# Patient Record
Sex: MALE | Race: BLACK OR AFRICAN AMERICAN | NOT HISPANIC OR LATINO | Employment: UNEMPLOYED | URBAN - NONMETROPOLITAN AREA
[De-identification: names, ages, dates, MRNs, and addresses within clinical notes are randomized per-mention and may not be internally consistent; named-entity substitution may affect disease eponyms.]

---

## 2021-09-29 ENCOUNTER — OFFICE VISIT (OUTPATIENT)
Dept: FAMILY MEDICINE | Facility: CLINIC | Age: 61
End: 2021-09-29
Payer: MEDICAID

## 2021-09-29 VITALS
WEIGHT: 182.38 LBS | SYSTOLIC BLOOD PRESSURE: 90 MMHG | HEIGHT: 70 IN | BODY MASS INDEX: 26.11 KG/M2 | OXYGEN SATURATION: 96 % | HEART RATE: 93 BPM | TEMPERATURE: 98 F | DIASTOLIC BLOOD PRESSURE: 70 MMHG | RESPIRATION RATE: 16 BRPM

## 2021-09-29 DIAGNOSIS — E87.6 HYPOKALEMIA: ICD-10-CM

## 2021-09-29 DIAGNOSIS — Z63.4 BEREAVEMENT: ICD-10-CM

## 2021-09-29 DIAGNOSIS — F32.A DEPRESSION, UNSPECIFIED DEPRESSION TYPE: Primary | ICD-10-CM

## 2021-09-29 DIAGNOSIS — F19.90 DRUG USE: ICD-10-CM

## 2021-09-29 LAB
ALBUMIN SERPL BCP-MCNC: 4.2 G/DL (ref 3.5–5)
ALBUMIN/GLOB SERPL: 1 {RATIO}
ALP SERPL-CCNC: 38 U/L (ref 45–115)
ALT SERPL W P-5'-P-CCNC: 31 U/L (ref 16–61)
ANION GAP SERPL CALCULATED.3IONS-SCNC: 7 MMOL/L (ref 7–16)
AST SERPL W P-5'-P-CCNC: 15 U/L (ref 15–37)
BASOPHILS # BLD AUTO: 0.04 K/UL (ref 0–0.2)
BASOPHILS NFR BLD AUTO: 0.5 % (ref 0–1)
BILIRUB SERPL-MCNC: 0.5 MG/DL (ref 0–1.2)
BUN SERPL-MCNC: 22 MG/DL (ref 7–18)
BUN/CREAT SERPL: 13 (ref 6–20)
CALCIUM SERPL-MCNC: 10.3 MG/DL (ref 8.5–10.1)
CHLORIDE SERPL-SCNC: 99 MMOL/L (ref 98–107)
CO2 SERPL-SCNC: 32 MMOL/L (ref 21–32)
CREAT SERPL-MCNC: 1.71 MG/DL (ref 0.7–1.3)
DIFFERENTIAL METHOD BLD: ABNORMAL
EOSINOPHIL # BLD AUTO: 0.3 K/UL (ref 0–0.5)
EOSINOPHIL NFR BLD AUTO: 3.9 % (ref 1–4)
ERYTHROCYTE [DISTWIDTH] IN BLOOD BY AUTOMATED COUNT: 14.3 % (ref 11.5–14.5)
GLOBULIN SER-MCNC: 4.3 G/DL (ref 2–4)
GLUCOSE SERPL-MCNC: 130 MG/DL (ref 74–106)
HCT VFR BLD AUTO: 43.2 % (ref 40–54)
HGB BLD-MCNC: 14.4 G/DL (ref 13.5–18)
IMM GRANULOCYTES # BLD AUTO: 0.02 K/UL (ref 0–0.04)
IMM GRANULOCYTES NFR BLD: 0.3 % (ref 0–0.4)
LYMPHOCYTES # BLD AUTO: 2.53 K/UL (ref 1–4.8)
LYMPHOCYTES NFR BLD AUTO: 32.6 % (ref 27–41)
MCH RBC QN AUTO: 28.3 PG (ref 27–31)
MCHC RBC AUTO-ENTMCNC: 33.3 G/DL (ref 32–36)
MCV RBC AUTO: 84.9 FL (ref 80–96)
MONOCYTES # BLD AUTO: 0.5 K/UL (ref 0–0.8)
MONOCYTES NFR BLD AUTO: 6.5 % (ref 2–6)
MPC BLD CALC-MCNC: 11.1 FL (ref 9.4–12.4)
NEUTROPHILS # BLD AUTO: 4.36 K/UL (ref 1.8–7.7)
NEUTROPHILS NFR BLD AUTO: 56.2 % (ref 53–65)
NRBC # BLD AUTO: 0 X10E3/UL
NRBC, AUTO (.00): 0 %
PLATELET # BLD AUTO: 171 K/UL (ref 150–400)
POTASSIUM SERPL-SCNC: 3 MMOL/L (ref 3.5–5.1)
PROT SERPL-MCNC: 8.5 G/DL (ref 6.4–8.2)
RBC # BLD AUTO: 5.09 M/UL (ref 4.6–6.2)
SODIUM SERPL-SCNC: 135 MMOL/L (ref 136–145)
WBC # BLD AUTO: 7.75 K/UL (ref 4.5–11)

## 2021-09-29 PROCEDURE — 99214 OFFICE O/P EST MOD 30 MIN: CPT | Mod: ,,, | Performed by: FAMILY MEDICINE

## 2021-09-29 PROCEDURE — 85025 CBC WITH DIFFERENTIAL: ICD-10-PCS | Mod: ,,, | Performed by: CLINICAL MEDICAL LABORATORY

## 2021-09-29 PROCEDURE — 99214 PR OFFICE/OUTPT VISIT, EST, LEVL IV, 30-39 MIN: ICD-10-PCS | Mod: ,,, | Performed by: FAMILY MEDICINE

## 2021-09-29 PROCEDURE — 80053 COMPREHENSIVE METABOLIC PANEL: ICD-10-PCS | Mod: ,,, | Performed by: CLINICAL MEDICAL LABORATORY

## 2021-09-29 PROCEDURE — 85025 COMPLETE CBC W/AUTO DIFF WBC: CPT | Mod: ,,, | Performed by: CLINICAL MEDICAL LABORATORY

## 2021-09-29 PROCEDURE — 80053 COMPREHEN METABOLIC PANEL: CPT | Mod: ,,, | Performed by: CLINICAL MEDICAL LABORATORY

## 2021-09-29 RX ORDER — SPIRONOLACTONE 25 MG/1
TABLET ORAL
COMMUNITY
Start: 2021-09-10 | End: 2022-12-14 | Stop reason: SDUPTHER

## 2021-09-29 RX ORDER — SACUBITRIL AND VALSARTAN 97; 103 MG/1; MG/1
TABLET, FILM COATED ORAL
COMMUNITY
Start: 2021-09-10 | End: 2022-12-14 | Stop reason: SDUPTHER

## 2021-09-29 RX ORDER — SERTRALINE HYDROCHLORIDE 50 MG/1
50 TABLET, FILM COATED ORAL DAILY
Qty: 45 TABLET | Refills: 0 | Status: SHIPPED | OUTPATIENT
Start: 2021-09-29 | End: 2021-11-16

## 2021-09-29 RX ORDER — NITROGLYCERIN 0.4 MG/1
TABLET SUBLINGUAL
COMMUNITY
Start: 2021-09-10

## 2021-09-29 RX ORDER — HYDROCHLOROTHIAZIDE 25 MG/1
TABLET ORAL
COMMUNITY
Start: 2021-09-20 | End: 2022-12-14

## 2021-09-29 SDOH — SOCIAL DETERMINANTS OF HEALTH (SDOH): DISSAPEARANCE AND DEATH OF FAMILY MEMBER: Z63.4

## 2021-09-30 PROBLEM — E87.6 HYPOKALEMIA: Status: ACTIVE | Noted: 2021-09-30

## 2021-09-30 RX ORDER — POTASSIUM CHLORIDE 750 MG/1
10 TABLET, EXTENDED RELEASE ORAL DAILY
Qty: 7 TABLET | Refills: 0 | Status: SHIPPED | OUTPATIENT
Start: 2021-09-30 | End: 2021-10-07

## 2021-11-16 DIAGNOSIS — F32.A DEPRESSION, UNSPECIFIED DEPRESSION TYPE: ICD-10-CM

## 2021-11-16 RX ORDER — SERTRALINE HYDROCHLORIDE 50 MG/1
TABLET, FILM COATED ORAL
Qty: 45 TABLET | Refills: 0 | Status: SHIPPED | OUTPATIENT
Start: 2021-11-16 | End: 2022-01-05 | Stop reason: SDUPTHER

## 2022-01-05 DIAGNOSIS — F32.A DEPRESSION, UNSPECIFIED DEPRESSION TYPE: Primary | ICD-10-CM

## 2022-01-05 RX ORDER — SERTRALINE HYDROCHLORIDE 50 MG/1
50 TABLET, FILM COATED ORAL DAILY
Qty: 90 TABLET | Refills: 0 | Status: SHIPPED | OUTPATIENT
Start: 2022-01-05 | End: 2022-03-29

## 2022-03-29 ENCOUNTER — OFFICE VISIT (OUTPATIENT)
Dept: FAMILY MEDICINE | Facility: CLINIC | Age: 62
End: 2022-03-29
Payer: MEDICAID

## 2022-03-29 VITALS
BODY MASS INDEX: 25.73 KG/M2 | WEIGHT: 183.81 LBS | RESPIRATION RATE: 16 BRPM | OXYGEN SATURATION: 98 % | DIASTOLIC BLOOD PRESSURE: 75 MMHG | HEIGHT: 71 IN | SYSTOLIC BLOOD PRESSURE: 110 MMHG | HEART RATE: 85 BPM | TEMPERATURE: 98 F

## 2022-03-29 DIAGNOSIS — G56.03 BILATERAL CARPAL TUNNEL SYNDROME: ICD-10-CM

## 2022-03-29 DIAGNOSIS — Z12.5 ENCOUNTER FOR PROSTATE CANCER SCREENING: ICD-10-CM

## 2022-03-29 DIAGNOSIS — E87.6 HYPOKALEMIA: Primary | ICD-10-CM

## 2022-03-29 DIAGNOSIS — Z13.220 ENCOUNTER FOR SCREENING FOR LIPID DISORDER: ICD-10-CM

## 2022-03-29 DIAGNOSIS — F32.A DEPRESSION, UNSPECIFIED DEPRESSION TYPE: ICD-10-CM

## 2022-03-29 DIAGNOSIS — Z13.1 ENCOUNTER FOR SCREENING EXAMINATION FOR IMPAIRED GLUCOSE REGULATION AND DIABETES MELLITUS: ICD-10-CM

## 2022-03-29 LAB
ALBUMIN SERPL BCP-MCNC: 4.1 G/DL (ref 3.5–5)
ALBUMIN/GLOB SERPL: 0.9 {RATIO}
ALP SERPL-CCNC: 54 U/L (ref 45–115)
ALT SERPL W P-5'-P-CCNC: 40 U/L (ref 16–61)
ANION GAP SERPL CALCULATED.3IONS-SCNC: 10 MMOL/L (ref 7–16)
AST SERPL W P-5'-P-CCNC: 19 U/L (ref 15–37)
BASOPHILS # BLD AUTO: 0.05 K/UL (ref 0–0.2)
BASOPHILS NFR BLD AUTO: 0.6 % (ref 0–1)
BILIRUB SERPL-MCNC: 0.4 MG/DL (ref 0–1.2)
BUN SERPL-MCNC: 24 MG/DL (ref 7–18)
BUN/CREAT SERPL: 16 (ref 6–20)
CALCIUM SERPL-MCNC: 10.1 MG/DL (ref 8.5–10.1)
CHLORIDE SERPL-SCNC: 97 MMOL/L (ref 98–107)
CHOLEST SERPL-MCNC: 148 MG/DL (ref 0–200)
CHOLEST/HDLC SERPL: 2.9 {RATIO}
CO2 SERPL-SCNC: 28 MMOL/L (ref 21–32)
CREAT SERPL-MCNC: 1.52 MG/DL (ref 0.7–1.3)
DIFFERENTIAL METHOD BLD: NORMAL
EOSINOPHIL # BLD AUTO: 0.22 K/UL (ref 0–0.5)
EOSINOPHIL NFR BLD AUTO: 2.8 % (ref 1–4)
ERYTHROCYTE [DISTWIDTH] IN BLOOD BY AUTOMATED COUNT: 14.3 % (ref 11.5–14.5)
GLOBULIN SER-MCNC: 4.7 G/DL (ref 2–4)
GLUCOSE SERPL-MCNC: 132 MG/DL (ref 74–106)
HCT VFR BLD AUTO: 48.9 % (ref 40–54)
HDLC SERPL-MCNC: 51 MG/DL (ref 40–60)
HGB BLD-MCNC: 15.9 G/DL (ref 13.5–18)
IMM GRANULOCYTES # BLD AUTO: 0.03 K/UL (ref 0–0.04)
IMM GRANULOCYTES NFR BLD: 0.4 % (ref 0–0.4)
LDLC SERPL CALC-MCNC: 61 MG/DL
LDLC/HDLC SERPL: 1.2 {RATIO}
LYMPHOCYTES # BLD AUTO: 2.27 K/UL (ref 1–4.8)
LYMPHOCYTES NFR BLD AUTO: 29.1 % (ref 27–41)
MCH RBC QN AUTO: 27.9 PG (ref 27–31)
MCHC RBC AUTO-ENTMCNC: 32.5 G/DL (ref 32–36)
MCV RBC AUTO: 85.8 FL (ref 80–96)
MONOCYTES # BLD AUTO: 0.46 K/UL (ref 0–0.8)
MONOCYTES NFR BLD AUTO: 5.9 % (ref 2–6)
MPC BLD CALC-MCNC: 11.7 FL (ref 9.4–12.4)
NEUTROPHILS # BLD AUTO: 4.77 K/UL (ref 1.8–7.7)
NEUTROPHILS NFR BLD AUTO: 61.2 % (ref 53–65)
NONHDLC SERPL-MCNC: 97 MG/DL
NRBC # BLD AUTO: 0 X10E3/UL
NRBC, AUTO (.00): 0 %
PLATELET # BLD AUTO: 166 K/UL (ref 150–400)
POTASSIUM SERPL-SCNC: 3.1 MMOL/L (ref 3.5–5.1)
PROT SERPL-MCNC: 8.8 G/DL (ref 6.4–8.2)
PSA SERPL-MCNC: 2.84 NG/ML (ref 0–4.1)
RBC # BLD AUTO: 5.7 M/UL (ref 4.6–6.2)
SODIUM SERPL-SCNC: 132 MMOL/L (ref 136–145)
TRIGL SERPL-MCNC: 179 MG/DL (ref 35–150)
VLDLC SERPL-MCNC: 36 MG/DL
WBC # BLD AUTO: 7.8 K/UL (ref 4.5–11)

## 2022-03-29 PROCEDURE — 80061 LIPID PANEL: ICD-10-PCS | Mod: ,,, | Performed by: CLINICAL MEDICAL LABORATORY

## 2022-03-29 PROCEDURE — 85025 CBC WITH DIFFERENTIAL: ICD-10-PCS | Mod: ,,, | Performed by: CLINICAL MEDICAL LABORATORY

## 2022-03-29 PROCEDURE — 99214 PR OFFICE/OUTPT VISIT, EST, LEVL IV, 30-39 MIN: ICD-10-PCS | Mod: ,,, | Performed by: FAMILY MEDICINE

## 2022-03-29 PROCEDURE — 85025 COMPLETE CBC W/AUTO DIFF WBC: CPT | Mod: ,,, | Performed by: CLINICAL MEDICAL LABORATORY

## 2022-03-29 PROCEDURE — G0103 PSA, SCREENING: ICD-10-PCS | Mod: ,,, | Performed by: CLINICAL MEDICAL LABORATORY

## 2022-03-29 PROCEDURE — 80053 COMPREHENSIVE METABOLIC PANEL: ICD-10-PCS | Mod: ,,, | Performed by: CLINICAL MEDICAL LABORATORY

## 2022-03-29 PROCEDURE — G0103 PSA SCREENING: HCPCS | Mod: ,,, | Performed by: CLINICAL MEDICAL LABORATORY

## 2022-03-29 PROCEDURE — 80061 LIPID PANEL: CPT | Mod: ,,, | Performed by: CLINICAL MEDICAL LABORATORY

## 2022-03-29 PROCEDURE — 99214 OFFICE O/P EST MOD 30 MIN: CPT | Mod: ,,, | Performed by: FAMILY MEDICINE

## 2022-03-29 PROCEDURE — 80053 COMPREHEN METABOLIC PANEL: CPT | Mod: ,,, | Performed by: CLINICAL MEDICAL LABORATORY

## 2022-03-29 RX ORDER — CARVEDILOL 25 MG/1
25 TABLET ORAL 2 TIMES DAILY
COMMUNITY
Start: 2022-03-22 | End: 2022-12-14 | Stop reason: SDUPTHER

## 2022-03-29 RX ORDER — CLOPIDOGREL BISULFATE 75 MG/1
75 TABLET ORAL DAILY
COMMUNITY
Start: 2022-02-16 | End: 2022-12-14 | Stop reason: SDUPTHER

## 2022-03-29 RX ORDER — ATORVASTATIN CALCIUM 40 MG/1
TABLET, FILM COATED ORAL
COMMUNITY
Start: 2022-02-15 | End: 2022-12-14 | Stop reason: SDUPTHER

## 2022-03-29 RX ORDER — SERTRALINE HYDROCHLORIDE 100 MG/1
100 TABLET, FILM COATED ORAL DAILY
Qty: 90 TABLET | Refills: 1 | Status: SHIPPED | OUTPATIENT
Start: 2022-03-29 | End: 2024-02-27

## 2022-03-29 NOTE — PROGRESS NOTES
Marcos De Los Santos DO   [facility]  [address]  [place]     PATIENT NAME: Reed Conde  : 1960  DATE: 3/29/22  MRN: [id number]      Billing Provider: Marcos De Los Santos DO  Level of Service:   Patient PCP Information     Provider PCP Type    Marcos De Los Santos DO General          Reason for Visit / Chief Complaint: Follow-up (6 mo follow up), Numbness (Numbness to cornelio thumbs and 1rst mc for 2 months. States that he wakes up with numbness. ), Diarrhea (Pt states that when he urinates first thing in the morning, he suddenly has diarrhea while voiding. This has been going on for apprx 2 months), and Back Pain (Lumbar pain- physical therapy didn't help with pain.)       Update PCP  Update Chief Complaint         History of Present Illness / Problem Focused Workflow     Reed Conde presents to the clinic for 6mo FU. Complains of 2mo hx of angina that is worse with exertion.       Review of Systems     Review of Systems   Constitutional: Negative.    Eyes: Negative.    Respiratory: Negative.    Cardiovascular: Negative.    Gastrointestinal: Negative.    All other systems reviewed and are negative.       Medical / Social / Family History     Past Medical History:   Diagnosis Date    Cardiomyopathy     Hypertension     Myocardial infarction 10/28/2015    Prolapse of cervical intervertebral disc without radiculopathy 09/10/2019       Past Surgical History:   Procedure Laterality Date    INSERTION OF BIVENTRICULAR IMPLANTABLE CARDIOVERTER-DEFIBRILLATOR (ICD)         Social History    reports that he has been smoking cigarettes. He started smoking about 45 years ago. He has been smoking about 0.50 packs per day. He has never used smokeless tobacco. He reports previous alcohol use. He reports current drug use. Drug: Marijuana.    Family History  's family history includes Alcohol abuse in his father; Diabetes in his brother and mother; Heart failure in his mother; Hypertension in  "his mother; Stomach cancer in his brother.    Medications and Allergies     Medications  Outpatient Medications Marked as Taking for the 3/29/22 encounter (Office Visit) with Marcos De Los Santos, DO   Medication Sig Dispense Refill    atorvastatin (LIPITOR) 40 MG tablet SMARTSI Tablet(s) By Mouth Every Evening      carvediloL (COREG) 25 MG tablet Take 25 mg by mouth 2 (two) times daily.      clopidogreL (PLAVIX) 75 mg tablet Take 75 mg by mouth once daily.      ENTRESTO  mg per tablet       hydroCHLOROthiazide (HYDRODIURIL) 25 MG tablet       nitroGLYCERIN (NITROSTAT) 0.4 MG SL tablet       sertraline (ZOLOFT) 50 MG tablet Take 1 tablet (50 mg total) by mouth once daily. 90 tablet 0    spironolactone (ALDACTONE) 25 MG tablet          Allergies  Review of patient's allergies indicates:  No Known Allergies    Physical Examination     Vitals:    22 1526   BP: 110/75   BP Location: Right arm   Patient Position: Sitting   Pulse: 85   Resp: 16   Temp: 97.6 °F (36.4 °C)   TempSrc: Oral   SpO2: 98%   Weight: 83.4 kg (183 lb 12.8 oz)   Height: 5' 11" (1.803 m)      Physical Exam  Vitals and nursing note reviewed.   Constitutional:       General: He is not in acute distress.     Appearance: Normal appearance. He is normal weight. He is not ill-appearing, toxic-appearing or diaphoretic.   Neck:      Vascular: No carotid bruit.   Cardiovascular:      Rate and Rhythm: Normal rate and regular rhythm.      Pulses: Normal pulses.      Heart sounds: Normal heart sounds.   Pulmonary:      Effort: Pulmonary effort is normal.   Lymphadenopathy:      Cervical: No cervical adenopathy.   Neurological:      Mental Status: He is alert.          Assessment and Plan (including Health Maintenance)      Problem List  Smart Sets  Document Outside HM   :    Plan:   CBC  CMP  LIPID PANEL  PSA  BL cock up wrist splints  Increase zoloft to 100mg PO QD  [facility]    Refer to cardiology      Health Maintenance " Due   Topic Date Due    Hepatitis C Screening  Never done    Lipid Panel  Never done    COVID-19 Vaccine (1) Never done    Pneumococcal Vaccines (Age 0-64) (1 of 2 - PPSV23) Never done    HIV Screening  Never done    TETANUS VACCINE  Never done    Colorectal Cancer Screening  Never done    Shingles Vaccine (1 of 2) Never done    Influenza Vaccine (1) Never done       Problem List Items Addressed This Visit        Renal/    Hypokalemia - Primary      Other Visit Diagnoses     Encounter for screening examination for impaired glucose regulation and diabetes mellitus            Hypokalemia    Encounter for screening examination for impaired glucose regulation and diabetes mellitus       The patient has no Health Maintenance topics of status Not Due    Procedures     No future appointments.     No follow-ups on file.       Signature:  Marcos De Los Santos DO    Date of encounter: 3/29/22    Education Documentation  No documentation found.  Education Comments  No comments found.       There are no Patient Instructions on file for this visit.

## 2022-03-30 RX ORDER — POTASSIUM CHLORIDE 750 MG/1
10 TABLET, EXTENDED RELEASE ORAL DAILY
Qty: 7 TABLET | Refills: 0 | Status: SHIPPED | OUTPATIENT
Start: 2022-03-30 | End: 2022-04-06

## 2022-07-04 PROBLEM — Z13.1 ENCOUNTER FOR SCREENING EXAMINATION FOR IMPAIRED GLUCOSE REGULATION AND DIABETES MELLITUS: Status: RESOLVED | Noted: 2022-03-29 | Resolved: 2022-07-04

## 2022-08-20 ENCOUNTER — HOSPITAL ENCOUNTER (EMERGENCY)
Facility: HOSPITAL | Age: 62
Discharge: HOME | End: 2022-08-21
Payer: MEDICAID

## 2022-08-20 DIAGNOSIS — A08.4 VIRAL GASTROENTERITIS: Primary | ICD-10-CM

## 2022-08-20 LAB
ALBUMIN SERPL BCP-MCNC: 3.8 G/DL (ref 3.5–5)
ALBUMIN/GLOB SERPL: 1.1 {RATIO}
ALP SERPL-CCNC: 46 U/L (ref 45–115)
ALT SERPL W P-5'-P-CCNC: 23 U/L (ref 16–61)
ANION GAP SERPL CALCULATED.3IONS-SCNC: 12 MMOL/L (ref 7–16)
AST SERPL W P-5'-P-CCNC: 14 U/L (ref 15–37)
BACTERIA #/AREA URNS HPF: NORMAL /HPF
BASOPHILS # BLD AUTO: 0.01 K/UL (ref 0–0.2)
BASOPHILS NFR BLD AUTO: 0.2 % (ref 0–1)
BILIRUB SERPL-MCNC: 0.5 MG/DL (ref 0–1.2)
BILIRUB UR QL STRIP: NEGATIVE
BUN SERPL-MCNC: 14 MG/DL (ref 7–18)
BUN/CREAT SERPL: 11 (ref 6–20)
CALCIUM SERPL-MCNC: 9.3 MG/DL (ref 8.5–10.1)
CHLORIDE SERPL-SCNC: 99 MMOL/L (ref 98–107)
CLARITY UR: CLEAR
CO2 SERPL-SCNC: 26 MMOL/L (ref 21–32)
COLOR UR: YELLOW
CREAT SERPL-MCNC: 1.3 MG/DL (ref 0.7–1.3)
DIFFERENTIAL METHOD BLD: ABNORMAL
EGFR (NO RACE VARIABLE) (RUSH/TITUS): 63 ML/MIN/1.73M²
EOSINOPHIL # BLD AUTO: 0.05 K/UL (ref 0–0.5)
EOSINOPHIL NFR BLD AUTO: 0.9 % (ref 1–4)
ERYTHROCYTE [DISTWIDTH] IN BLOOD BY AUTOMATED COUNT: 14.7 % (ref 11.5–14.5)
GLOBULIN SER-MCNC: 3.6 G/DL (ref 2–4)
GLUCOSE SERPL-MCNC: 114 MG/DL (ref 74–106)
GLUCOSE UR STRIP-MCNC: NEGATIVE MG/DL
HCT VFR BLD AUTO: 42.6 % (ref 40–54)
HGB BLD-MCNC: 14.4 G/DL (ref 13.5–18)
KETONES UR STRIP-SCNC: NEGATIVE MG/DL
LEUKOCYTE ESTERASE UR QL STRIP: NEGATIVE
LYMPHOCYTES # BLD AUTO: 0.6 K/UL (ref 1–4.8)
LYMPHOCYTES NFR BLD AUTO: 11 % (ref 27–41)
MCH RBC QN AUTO: 28 PG (ref 27–31)
MCHC RBC AUTO-ENTMCNC: 33.8 G/DL (ref 32–36)
MCV RBC AUTO: 82.9 FL (ref 80–96)
MONOCYTES # BLD AUTO: 0.43 K/UL (ref 0–0.8)
MONOCYTES NFR BLD AUTO: 7.9 % (ref 2–6)
MPC BLD CALC-MCNC: 11.1 FL (ref 9.4–12.4)
NEUTROPHILS # BLD AUTO: 4.35 K/UL (ref 1.8–7.7)
NEUTROPHILS NFR BLD AUTO: 80 % (ref 53–65)
NITRITE UR QL STRIP: NEGATIVE
PH UR STRIP: 6 PH UNITS
PLATELET # BLD AUTO: 122 K/UL (ref 150–400)
POTASSIUM SERPL-SCNC: 2.9 MMOL/L (ref 3.5–5.1)
PROT SERPL-MCNC: 7.4 G/DL (ref 6.4–8.2)
PROT UR QL STRIP: NEGATIVE
RAPID GROUP A STREP: NEGATIVE
RBC # BLD AUTO: 5.14 M/UL (ref 4.6–6.2)
RBC # UR STRIP: ABNORMAL /UL
RBC #/AREA URNS HPF: NORMAL /HPF
SODIUM SERPL-SCNC: 134 MMOL/L (ref 136–145)
SP GR UR STRIP: 1.01
SQUAMOUS #/AREA URNS LPF: NORMAL /LPF
UROBILINOGEN UR STRIP-ACNC: 0.2 MG/DL
WBC # BLD AUTO: 5.44 K/UL (ref 4.5–11)
WBC #/AREA URNS HPF: NORMAL /HPF

## 2022-08-20 PROCEDURE — 96361 HYDRATE IV INFUSION ADD-ON: CPT

## 2022-08-20 PROCEDURE — 63600175 PHARM REV CODE 636 W HCPCS: Performed by: REGISTERED NURSE

## 2022-08-20 PROCEDURE — 36592 COLLECT BLOOD FROM PICC: CPT | Performed by: REGISTERED NURSE

## 2022-08-20 PROCEDURE — 80053 COMPREHEN METABOLIC PANEL: CPT | Performed by: REGISTERED NURSE

## 2022-08-20 PROCEDURE — 96375 TX/PRO/DX INJ NEW DRUG ADDON: CPT

## 2022-08-20 PROCEDURE — 85025 COMPLETE CBC W/AUTO DIFF WBC: CPT | Performed by: REGISTERED NURSE

## 2022-08-20 PROCEDURE — 87880 STREP A ASSAY W/OPTIC: CPT | Performed by: REGISTERED NURSE

## 2022-08-20 PROCEDURE — 96374 THER/PROPH/DIAG INJ IV PUSH: CPT

## 2022-08-20 PROCEDURE — 81001 URINALYSIS AUTO W/SCOPE: CPT | Performed by: REGISTERED NURSE

## 2022-08-20 PROCEDURE — 99283 EMERGENCY DEPT VISIT LOW MDM: CPT | Mod: ,,, | Performed by: REGISTERED NURSE

## 2022-08-20 PROCEDURE — 87081 CULTURE SCREEN ONLY: CPT | Performed by: REGISTERED NURSE

## 2022-08-20 PROCEDURE — 99283 PR EMERGENCY DEPT VISIT,LEVEL III: ICD-10-PCS | Mod: ,,, | Performed by: REGISTERED NURSE

## 2022-08-20 PROCEDURE — 25000003 PHARM REV CODE 250: Performed by: REGISTERED NURSE

## 2022-08-20 PROCEDURE — 99284 EMERGENCY DEPT VISIT MOD MDM: CPT | Mod: 25

## 2022-08-20 RX ORDER — ONDANSETRON 2 MG/ML
4 INJECTION INTRAMUSCULAR; INTRAVENOUS
Status: COMPLETED | OUTPATIENT
Start: 2022-08-20 | End: 2022-08-20

## 2022-08-20 RX ORDER — POTASSIUM CHLORIDE 20 MEQ/1
40 TABLET, EXTENDED RELEASE ORAL
Status: COMPLETED | OUTPATIENT
Start: 2022-08-20 | End: 2022-08-20

## 2022-08-20 RX ORDER — POTASSIUM CHLORIDE 7.45 MG/ML
20 INJECTION INTRAVENOUS
Status: COMPLETED | OUTPATIENT
Start: 2022-08-20 | End: 2022-08-20

## 2022-08-20 RX ADMIN — SODIUM CHLORIDE 1000 ML: 9 INJECTION, SOLUTION INTRAVENOUS at 09:08

## 2022-08-20 RX ADMIN — POTASSIUM CHLORIDE 20 MEQ: 7.46 INJECTION, SOLUTION INTRAVENOUS at 10:08

## 2022-08-20 RX ADMIN — ONDANSETRON 4 MG: 2 INJECTION INTRAMUSCULAR; INTRAVENOUS at 09:08

## 2022-08-20 RX ADMIN — SODIUM CHLORIDE 1000 ML: 9 INJECTION, SOLUTION INTRAVENOUS at 10:08

## 2022-08-20 RX ADMIN — POTASSIUM CHLORIDE 40 MEQ: 20 TABLET, EXTENDED RELEASE ORAL at 10:08

## 2022-08-21 VITALS
DIASTOLIC BLOOD PRESSURE: 73 MMHG | HEIGHT: 71 IN | WEIGHT: 180 LBS | RESPIRATION RATE: 18 BRPM | HEART RATE: 76 BPM | TEMPERATURE: 99 F | SYSTOLIC BLOOD PRESSURE: 114 MMHG | OXYGEN SATURATION: 97 % | BODY MASS INDEX: 25.2 KG/M2

## 2022-08-21 PROBLEM — K52.9 GASTROENTERITIS: Status: ACTIVE | Noted: 2022-08-21

## 2022-08-21 PROBLEM — A08.4 VIRAL GASTROENTERITIS: Status: ACTIVE | Noted: 2022-08-21

## 2022-08-21 PROCEDURE — 25000003 PHARM REV CODE 250: Performed by: REGISTERED NURSE

## 2022-08-21 RX ORDER — ONDANSETRON 4 MG/1
4 TABLET, FILM COATED ORAL EVERY 6 HOURS
Qty: 12 TABLET | Refills: 0 | Status: SHIPPED | OUTPATIENT
Start: 2022-08-21 | End: 2022-12-14

## 2022-08-21 RX ORDER — POTASSIUM CHLORIDE 20 MEQ/1
40 TABLET, EXTENDED RELEASE ORAL
Status: COMPLETED | OUTPATIENT
Start: 2022-08-21 | End: 2022-08-21

## 2022-08-21 RX ADMIN — POTASSIUM CHLORIDE 40 MEQ: 20 TABLET, EXTENDED RELEASE ORAL at 01:08

## 2022-08-21 NOTE — DISCHARGE INSTRUCTIONS
-Increase fluid intake while having diarrhea:  1 Bottle of Gatorade to 2 bottles of water  -Follow up with Dr. Heather Denise by Tuesday 8-23-22 for lab recheck

## 2022-08-21 NOTE — ED PROVIDER NOTES
"Encounter Date: 8/20/2022       History     Chief Complaint   Patient presents with    Vomiting    Diarrhea    Nausea     Reed Conde is a 60 yo AAM that presents with N/V/D since he woke up this afternoon.  Pt denies any pain or discomfort just "feel really weak".    The history is provided by the patient.     Review of patient's allergies indicates:  No Known Allergies  Past Medical History:   Diagnosis Date    Cardiomyopathy     Hypertension     Myocardial infarction 10/28/2015    Prolapse of cervical intervertebral disc without radiculopathy 09/10/2019     Past Surgical History:   Procedure Laterality Date    INSERTION OF BIVENTRICULAR IMPLANTABLE CARDIOVERTER-DEFIBRILLATOR (ICD)  2019     Family History   Problem Relation Age of Onset    Diabetes Mother     Hypertension Mother     Heart failure Mother     Alcohol abuse Father     Diabetes Brother     Stomach cancer Brother      Social History     Tobacco Use    Smoking status: Current Every Day Smoker     Packs/day: 0.50     Types: Cigarettes     Start date: 1977    Smokeless tobacco: Never Used   Substance Use Topics    Alcohol use: Not Currently    Drug use: Yes     Types: Marijuana     Review of Systems   Constitutional: Positive for activity change and appetite change. Negative for diaphoresis and fever.   HENT: Negative.    Eyes: Negative.    Respiratory: Negative for cough and shortness of breath.    Cardiovascular: Negative for chest pain.   Gastrointestinal: Positive for diarrhea, nausea and vomiting.   Endocrine: Negative.    Genitourinary: Negative.    Musculoskeletal: Negative.    Skin: Negative.    Neurological: Positive for weakness. Negative for dizziness, tremors, light-headedness, numbness and headaches.   Psychiatric/Behavioral: Negative.        Physical Exam     Initial Vitals [08/20/22 2030]   BP Pulse Resp Temp SpO2   107/73 94 16 98.9 °F (37.2 °C) 97 %      MAP       --         Physical Exam    Constitutional: He appears " well-developed and well-nourished. He is not diaphoretic. No distress.   HENT:   Head: Normocephalic and atraumatic.   Right Ear: External ear normal.   Left Ear: External ear normal.   Nose: Nose normal.   Mouth/Throat: Oropharynx is clear and moist. No oropharyngeal exudate.   Eyes: Conjunctivae and EOM are normal. Pupils are equal, round, and reactive to light.   Neck: Neck supple.   Normal range of motion.  Cardiovascular: Normal rate, regular rhythm, normal heart sounds and intact distal pulses.   Pulmonary/Chest: Breath sounds normal. No respiratory distress.   Abdominal: Abdomen is soft. Bowel sounds are normal. There is no abdominal tenderness. There is no guarding.   Musculoskeletal:         General: No edema. Normal range of motion.      Cervical back: Normal range of motion and neck supple.     Lymphadenopathy:     He has no cervical adenopathy.   Neurological: He is alert and oriented to person, place, and time. He has normal strength. GCS score is 15. GCS eye subscore is 4. GCS verbal subscore is 5. GCS motor subscore is 6.   Skin: Skin is warm and dry. Capillary refill takes less than 2 seconds.   Psychiatric: He has a normal mood and affect. His behavior is normal. Judgment and thought content normal.         Medical Screening Exam   See Full Note    ED Course   Procedures  Labs Reviewed   COMPREHENSIVE METABOLIC PANEL - Abnormal; Notable for the following components:       Result Value    Sodium 134 (*)     Potassium 2.9 (*)     Glucose 114 (*)     AST 14 (*)     All other components within normal limits   URINALYSIS, REFLEX TO URINE CULTURE - Abnormal; Notable for the following components:    Blood, UA Trace-Intact (*)     All other components within normal limits   CBC WITH DIFFERENTIAL - Abnormal; Notable for the following components:    RDW 14.7 (*)     Platelet Count 122 (*)     Neutrophils % 80.0 (*)     Lymphocytes % 11.0 (*)     Lymphocytes, Absolute 0.60 (*)     Monocytes % 7.9 (*)      "Eosinophils % 0.9 (*)     All other components within normal limits   THROAT SCREEN, RAPID STREP - Normal   CULTURE, STREP A,  THROAT - Normal   URINALYSIS, MICROSCOPIC - Normal   CBC W/ AUTO DIFFERENTIAL    Narrative:     The following orders were created for panel order CBC auto differential.  Procedure                               Abnormality         Status                     ---------                               -----------         ------                     CBC with Differential[[id number]]        Abnormal            Final result                 Please view results for these tests on the individual orders.          Imaging Results    None          Medications   ondansetron injection 4 mg (4 mg Intravenous Given 8/20/22 2105)   sodium chloride 0.9% bolus 1,000 mL (0 mLs Intravenous Stopped 8/20/22 2205)   potassium chloride 10 mEq in 100 mL IVPB (0 mEq Intravenous Stopped 8/20/22 2217)   potassium chloride SA CR tablet 40 mEq (40 mEq Oral Given 8/20/22 2213)   sodium chloride 0.9% bolus 1,000 mL (0 mLs Intravenous Stopped 8/21/22 0015)   potassium chloride SA CR tablet 40 mEq (40 mEq Oral Given 8/21/22 0129)     Medical Decision Making:   ED Management:  -Pt no longer with N/V  -States he feels "much better"  -Diarrhea continues but has slowed down  -Will give another 40mEq K+ po prior to discharge  -Instructions give to follow up with provider of choice Monday or Tuesday to have labs rechecked.  Verbalized understanding.  -Will send in prescription for Zofran                   Clinical Impression:   Final diagnoses:  [A08.4] Viral gastroenteritis (Primary)          ED Disposition Condition    Discharge Stable        ED Prescriptions     Medication Sig Dispense Start Date End Date Auth. Provider    ondansetron (ZOFRAN) 4 MG tablet Take 1 tablet (4 mg total) by mouth every 6 (six) hours. 12 tablet 8/21/2022  MARIN Davidson        Follow-up Information     Follow up With Specialties Details Why Contact Info "    Heather Denise MD Family Medicine In 2 days For lab recheck [address]  [facility] [place] [address]  [phone]             MARIN Davidson  08/21/22 0120       MARIN Davidson  08/25/22 0003

## 2022-08-24 LAB — DEPRECATED S PYO AG THROAT QL EIA: NORMAL

## 2022-12-14 ENCOUNTER — OFFICE VISIT (OUTPATIENT)
Dept: FAMILY MEDICINE | Facility: CLINIC | Age: 62
End: 2022-12-14
Payer: MEDICAID

## 2022-12-14 VITALS
OXYGEN SATURATION: 98 % | TEMPERATURE: 98 F | BODY MASS INDEX: 26.13 KG/M2 | SYSTOLIC BLOOD PRESSURE: 78 MMHG | HEART RATE: 76 BPM | RESPIRATION RATE: 18 BRPM | WEIGHT: 186.63 LBS | DIASTOLIC BLOOD PRESSURE: 56 MMHG | HEIGHT: 71 IN

## 2022-12-14 DIAGNOSIS — Z23 NEED FOR VACCINATION: ICD-10-CM

## 2022-12-14 DIAGNOSIS — Z12.11 COLON CANCER SCREENING: ICD-10-CM

## 2022-12-14 DIAGNOSIS — M54.41 CHRONIC BILATERAL LOW BACK PAIN WITH BILATERAL SCIATICA: ICD-10-CM

## 2022-12-14 DIAGNOSIS — Z11.4 SCREENING FOR HIV WITHOUT PRESENCE OF RISK FACTORS: ICD-10-CM

## 2022-12-14 DIAGNOSIS — R73.9 HYPERGLYCEMIA: ICD-10-CM

## 2022-12-14 DIAGNOSIS — Z79.899 MEDICAL MARIJUANA USE: ICD-10-CM

## 2022-12-14 DIAGNOSIS — Z95.810 ICD (IMPLANTABLE CARDIOVERTER-DEFIBRILLATOR) IN PLACE: ICD-10-CM

## 2022-12-14 DIAGNOSIS — F32.A DEPRESSION, UNSPECIFIED DEPRESSION TYPE: ICD-10-CM

## 2022-12-14 DIAGNOSIS — M54.42 CHRONIC BILATERAL LOW BACK PAIN WITH BILATERAL SCIATICA: ICD-10-CM

## 2022-12-14 DIAGNOSIS — I42.9 CARDIOMYOPATHY, UNSPECIFIED TYPE: ICD-10-CM

## 2022-12-14 DIAGNOSIS — G89.29 CHRONIC BILATERAL LOW BACK PAIN WITH BILATERAL SCIATICA: ICD-10-CM

## 2022-12-14 DIAGNOSIS — F14.91 HISTORY OF COCAINE USE: ICD-10-CM

## 2022-12-14 DIAGNOSIS — Z11.59 ENCOUNTER FOR HEPATITIS C SCREENING TEST FOR LOW RISK PATIENT: ICD-10-CM

## 2022-12-14 DIAGNOSIS — I10 PRIMARY HYPERTENSION: Primary | ICD-10-CM

## 2022-12-14 PROCEDURE — 1160F PR REVIEW ALL MEDS BY PRESCRIBER/CLIN PHARMACIST DOCUMENTED: ICD-10-PCS | Mod: CPTII,,, | Performed by: FAMILY MEDICINE

## 2022-12-14 PROCEDURE — 1160F RVW MEDS BY RX/DR IN RCRD: CPT | Mod: CPTII,,, | Performed by: FAMILY MEDICINE

## 2022-12-14 PROCEDURE — 3078F DIAST BP <80 MM HG: CPT | Mod: CPTII,,, | Performed by: FAMILY MEDICINE

## 2022-12-14 PROCEDURE — 99214 PR OFFICE/OUTPT VISIT, EST, LEVL IV, 30-39 MIN: ICD-10-PCS | Mod: 25,,, | Performed by: FAMILY MEDICINE

## 2022-12-14 PROCEDURE — 3074F SYST BP LT 130 MM HG: CPT | Mod: CPTII,,, | Performed by: FAMILY MEDICINE

## 2022-12-14 PROCEDURE — 90677 PNEUMOCOCCAL CONJUGATE VACCINE 20-VALENT: ICD-10-PCS | Mod: ,,, | Performed by: FAMILY MEDICINE

## 2022-12-14 PROCEDURE — 90471 IMMUNIZATION ADMIN: CPT | Mod: ,,, | Performed by: FAMILY MEDICINE

## 2022-12-14 PROCEDURE — 4010F PR ACE/ARB THEARPY RXD/TAKEN: ICD-10-PCS | Mod: CPTII,,, | Performed by: FAMILY MEDICINE

## 2022-12-14 PROCEDURE — 1159F PR MEDICATION LIST DOCUMENTED IN MEDICAL RECORD: ICD-10-PCS | Mod: CPTII,,, | Performed by: FAMILY MEDICINE

## 2022-12-14 PROCEDURE — 99214 OFFICE O/P EST MOD 30 MIN: CPT | Mod: 25,,, | Performed by: FAMILY MEDICINE

## 2022-12-14 PROCEDURE — 90677 PCV20 VACCINE IM: CPT | Mod: ,,, | Performed by: FAMILY MEDICINE

## 2022-12-14 PROCEDURE — 3008F BODY MASS INDEX DOCD: CPT | Mod: CPTII,,, | Performed by: FAMILY MEDICINE

## 2022-12-14 PROCEDURE — 3078F PR MOST RECENT DIASTOLIC BLOOD PRESSURE < 80 MM HG: ICD-10-PCS | Mod: CPTII,,, | Performed by: FAMILY MEDICINE

## 2022-12-14 PROCEDURE — 4010F ACE/ARB THERAPY RXD/TAKEN: CPT | Mod: CPTII,,, | Performed by: FAMILY MEDICINE

## 2022-12-14 PROCEDURE — 90471 PNEUMOCOCCAL CONJUGATE VACCINE 20-VALENT: ICD-10-PCS | Mod: ,,, | Performed by: FAMILY MEDICINE

## 2022-12-14 PROCEDURE — 1159F MED LIST DOCD IN RCRD: CPT | Mod: CPTII,,, | Performed by: FAMILY MEDICINE

## 2022-12-14 PROCEDURE — 3074F PR MOST RECENT SYSTOLIC BLOOD PRESSURE < 130 MM HG: ICD-10-PCS | Mod: CPTII,,, | Performed by: FAMILY MEDICINE

## 2022-12-14 PROCEDURE — 3008F PR BODY MASS INDEX (BMI) DOCUMENTED: ICD-10-PCS | Mod: CPTII,,, | Performed by: FAMILY MEDICINE

## 2022-12-14 RX ORDER — CLOPIDOGREL BISULFATE 75 MG/1
75 TABLET ORAL DAILY
Qty: 90 TABLET | Refills: 1 | Status: SHIPPED | OUTPATIENT
Start: 2022-12-14 | End: 2024-02-27

## 2022-12-14 RX ORDER — FUROSEMIDE 80 MG/1
80 TABLET ORAL DAILY
Qty: 90 TABLET | Refills: 1 | Status: SHIPPED | OUTPATIENT
Start: 2022-12-14 | End: 2024-02-27

## 2022-12-14 RX ORDER — DULOXETIN HYDROCHLORIDE 30 MG/1
30 CAPSULE, DELAYED RELEASE ORAL DAILY
Qty: 90 CAPSULE | Refills: 1 | Status: SHIPPED | OUTPATIENT
Start: 2022-12-14 | End: 2023-12-14

## 2022-12-14 RX ORDER — FUROSEMIDE 80 MG/1
80 TABLET ORAL
COMMUNITY
Start: 2022-07-28 | End: 2022-12-14 | Stop reason: SDUPTHER

## 2022-12-14 RX ORDER — POTASSIUM CHLORIDE 750 MG/1
10 TABLET, EXTENDED RELEASE ORAL DAILY
Qty: 90 TABLET | Refills: 1 | Status: SHIPPED | OUTPATIENT
Start: 2022-12-14 | End: 2023-06-12

## 2022-12-14 RX ORDER — CARVEDILOL 12.5 MG/1
12.5 TABLET ORAL 2 TIMES DAILY
Qty: 180 TABLET | Refills: 1 | Status: SHIPPED | OUTPATIENT
Start: 2022-12-14 | End: 2024-02-27

## 2022-12-14 RX ORDER — SPIRONOLACTONE 25 MG/1
25 TABLET ORAL DAILY
Qty: 90 TABLET | Refills: 1 | Status: SHIPPED | OUTPATIENT
Start: 2022-12-14 | End: 2023-05-23 | Stop reason: SDUPTHER

## 2022-12-14 RX ORDER — CARVEDILOL 25 MG/1
25 TABLET ORAL 2 TIMES DAILY
Qty: 180 TABLET | Refills: 1 | Status: SHIPPED | OUTPATIENT
Start: 2022-12-14 | End: 2022-12-14 | Stop reason: SDUPTHER

## 2022-12-14 RX ORDER — ATORVASTATIN CALCIUM 40 MG/1
TABLET, FILM COATED ORAL
Qty: 90 TABLET | Refills: 1 | Status: SHIPPED | OUTPATIENT
Start: 2022-12-14

## 2022-12-14 RX ORDER — SACUBITRIL AND VALSARTAN 97; 103 MG/1; MG/1
1 TABLET, FILM COATED ORAL 2 TIMES DAILY
Qty: 180 TABLET | Refills: 1 | Status: SHIPPED | OUTPATIENT
Start: 2022-12-14 | End: 2023-06-12

## 2022-12-14 NOTE — PROGRESS NOTES
Heather Denise MD        PATIENT NAME: Reed Conde  : 1960  DATE: 22  MRN: [id number]      Billing Provider: Heather Denise MD  Level of Service:   Patient PCP Information       Provider PCP Type    Heather Denise MD General            Reason for Visit / Chief Complaint: Establish Care (Here to establish care. ), Dizziness (C/o dizziness when standing. ), and Hypertension (B/P 90/60 sitting and 78/56 standing)       History of Present Illness      Reed Conde presents to the clinic with Establish Care (Here to establish care. ), Dizziness (C/o dizziness when standing. ), and Hypertension (B/P 90/60 sitting and 78/56 standing)     Back Pain  This is a chronic problem. The pain is at a severity of 8/10. The pain is moderate. The symptoms are aggravated by bending, sitting, twisting and standing. Pertinent negatives include no chest pain, fever or headaches.   Hypertension  This is a chronic problem. Pertinent negatives include no anxiety, blurred vision, chest pain, headaches, malaise/fatigue, neck pain, palpitations, peripheral edema, PND, shortness of breath or sweats.     Review of Systems     Review of Systems   Constitutional:  Negative for activity change, appetite change, fatigue, fever and malaise/fatigue.   Eyes:  Negative for blurred vision.   Respiratory:  Negative for shortness of breath.    Cardiovascular:  Negative for chest pain, palpitations and PND.   Musculoskeletal:  Positive for back pain. Negative for neck pain.   Allergic/Immunologic: Positive for environmental allergies.   Neurological:  Negative for headaches.   Psychiatric/Behavioral:  Negative for agitation, behavioral problems and suicidal ideas.      Medical / Social / Family History     Past Medical History:   Diagnosis Date    Cardiomyopathy     Hypertension     Myocardial infarction 10/28/2015    Prolapse of cervical intervertebral disc without radiculopathy 09/10/2019       Past Surgical History:   Procedure  "Laterality Date    INSERTION OF BIVENTRICULAR IMPLANTABLE CARDIOVERTER-DEFIBRILLATOR (ICD)  2019       Social History    reports that he has been smoking cigarettes. He started smoking about 45 years ago. He has been smoking an average of .5 packs per day. He has never used smokeless tobacco. He reports that he does not currently use alcohol. He reports current drug use. Drug: Marijuana.    Family History  's family history includes Alcohol abuse in his father; Diabetes in his brother and mother; Heart failure in his mother; Hypertension in his mother; Stomach cancer in his brother.    Medications and Allergies     Medications  Outpatient Medications Marked as Taking for the 22 encounter (Office Visit) with Heather Denise MD   Medication Sig Dispense Refill    nitroGLYCERIN (NITROSTAT) 0.4 MG SL tablet       sertraline (ZOLOFT) 100 MG tablet Take 1 tablet (100 mg total) by mouth once daily. 90 tablet 1    [DISCONTINUED] atorvastatin (LIPITOR) 40 MG tablet SMARTSI Tablet(s) By Mouth Every Evening      [DISCONTINUED] carvediloL (COREG) 25 MG tablet Take 25 mg by mouth 2 (two) times daily.      [DISCONTINUED] clopidogreL (PLAVIX) 75 mg tablet Take 75 mg by mouth once daily.      [DISCONTINUED] ENTRESTO  mg per tablet       [DISCONTINUED] furosemide (LASIX) 80 MG tablet Take 80 mg by mouth.      [DISCONTINUED] hydroCHLOROthiazide (HYDRODIURIL) 25 MG tablet       [DISCONTINUED] spironolactone (ALDACTONE) 25 MG tablet          Allergies  Review of patient's allergies indicates:  No Known Allergies    Physical Examination   BP (!) 78/56 (BP Location: Left arm, Patient Position: Standing)   Pulse 76   Temp 97.6 °F (36.4 °C) (Oral)   Resp 18   Ht 5' 11" (1.803 m)   Wt 84.6 kg (186 lb 9.6 oz)   SpO2 98%   BMI 26.03 kg/m²     Physical Exam  Constitutional:       Appearance: Normal appearance. He is normal weight.   Cardiovascular:      Rate and Rhythm: Normal rate and regular rhythm.   Pulmonary:    "   Effort: Pulmonary effort is normal.      Breath sounds: Normal breath sounds.   Musculoskeletal:      Lumbar back: Tenderness present. Decreased range of motion.   Neurological:      Mental Status: He is alert.   Psychiatric:         Mood and Affect: Mood normal.         Behavior: Behavior normal.         Assessment and Plan (including Health Maintenance)     Plan:         Problem List Items Addressed This Visit          Psychiatric    Depression    Relevant Medications    DULoxetine (CYMBALTA) 30 MG capsule       Cardiac/Vascular    Cardiomyopathy    Relevant Medications    spironolactone (ALDACTONE) 25 MG tablet    atorvastatin (LIPITOR) 40 MG tablet    clopidogreL (PLAVIX) 75 mg tablet    furosemide (LASIX) 80 MG tablet    potassium chloride SA (K-DUR,KLOR-CON M) 10 MEQ tablet    carvediloL (COREG) 12.5 MG tablet    Other Relevant Orders    Lipid Panel    Primary hypertension - Primary    Relevant Medications    ENTRESTO  mg per tablet    Other Relevant Orders    Comprehensive Metabolic Panel    ICD (implantable cardioverter-defibrillator) in place       Endocrine    Hyperglycemia    Relevant Orders    Hemoglobin A1C       Orthopedic    Chronic bilateral low back pain with bilateral sciatica       Palliative Care    Medical marijuana use     Other Visit Diagnoses       Screening for HIV without presence of risk factors        Relevant Orders    HIV 1/2 Ag/Ab (4th Gen)    Encounter for hepatitis C screening test for low risk patient        Relevant Orders    Hepatitis C Antibody    Colon cancer screening        Relevant Orders    Cologuard Screening (Multitarget Stool DNA)    Need for vaccination        Relevant Orders    (In Office Administered) Pneumococcal Conjugate Vaccine (20 Valent) (IM) (Completed)    History of cocaine use               reviewed  I certified that this is my patient and that I have an understanding of chronic conditions and records were reviewed  Due to medical conditions this pt  is a good candidate for the medical marijuana program  Ref number [id number]  Discussed fare act and choice of dispensary   Risk of cannabis used discussed  Will follow up in 6 months to asses the progress and response to cannabis use  Other treatments that have been tried and failed  Do not share Cannabis as this is illegal  Make sure you keep this in a safe place     Labs send will come back fasting  PCV 20 given today    Follow up in about 6 months (around 6/14/2023).        Signature:  Heather Denise MD      Date of encounter: 12/14/22

## 2022-12-14 NOTE — PROGRESS NOTES
12/14/22 1328   Depression Patient Health Questionnaire (PHQ-2)   Over the last two weeks how often have you been bothered by little interest or pleasure in doing things 2   Over the last two weeks how often have you been bothered by feeling down, depressed or hopeless 2   PHQ-2 Total Score 4   Depression Patient Health Questionnaire (PHQ-9)   Over the last two weeks how often have you been bothered by trouble falling or staying asleep, or sleeping too much 3   Over the last two weeks how often have you been bothered by feeling tired or having little energy 3   Over the last two weeks how often have you been bothered by a poor appetite or overeating 0   Over the last two weeks how often have you been bothered by feeling bad about yourself - or that you are a failure or have let yourself or your family down 0   Over the last two weeks how often have you been bothered by trouble concentrating on things, such as reading the newspaper or watching television 1   Over the last two weeks how often have you been bothered by moving or speaking so slowly that other people could have noticed. Or the opposite - being so fidgety or restless that you have been moving around a lot more than usual. 1   Over the last two weeks how often have you been bothered by thoughts that you would be better off dead, or of hurting yourself 0   If you checked off any problems, how difficult have these problems made it for you to do your work, take care of things at home or get along with other people? Somewhat difficult   PHQ-9 Score 12   PHQ-9 Interpretation Moderate

## 2023-01-19 ENCOUNTER — TELEPHONE (OUTPATIENT)
Dept: FAMILY MEDICINE | Facility: CLINIC | Age: 63
End: 2023-01-19
Payer: MEDICAID

## 2023-01-19 NOTE — TELEPHONE ENCOUNTER
Pt called back wanting to know his lab results and discussed with him cutting back on carbs and explained examples of foods and drinks to avoid. Inst we will repeat labs in 6 months. Verbalized understanding

## 2023-05-23 DIAGNOSIS — I42.9 CARDIOMYOPATHY, UNSPECIFIED TYPE: ICD-10-CM

## 2023-05-23 RX ORDER — SPIRONOLACTONE 25 MG/1
25 TABLET ORAL DAILY
Qty: 30 TABLET | Refills: 0 | Status: SHIPPED | OUTPATIENT
Start: 2023-05-23 | End: 2024-02-27

## 2024-02-27 ENCOUNTER — OFFICE VISIT (OUTPATIENT)
Dept: FAMILY MEDICINE | Facility: CLINIC | Age: 64
End: 2024-02-27
Payer: MEDICAID

## 2024-02-27 VITALS
DIASTOLIC BLOOD PRESSURE: 62 MMHG | RESPIRATION RATE: 20 BRPM | TEMPERATURE: 98 F | BODY MASS INDEX: 24.31 KG/M2 | HEART RATE: 60 BPM | HEIGHT: 71 IN | SYSTOLIC BLOOD PRESSURE: 100 MMHG | WEIGHT: 173.63 LBS | OXYGEN SATURATION: 100 %

## 2024-02-27 DIAGNOSIS — R51.9 ACUTE NONINTRACTABLE HEADACHE, UNSPECIFIED HEADACHE TYPE: Primary | ICD-10-CM

## 2024-02-27 DIAGNOSIS — R09.81 NASAL CONGESTION: ICD-10-CM

## 2024-02-27 DIAGNOSIS — J01.10 ACUTE NON-RECURRENT FRONTAL SINUSITIS: ICD-10-CM

## 2024-02-27 LAB
CTP QC/QA: YES
CTP QC/QA: YES
FLUAV AG NPH QL: NEGATIVE
FLUBV AG NPH QL: NEGATIVE
SARS-COV-2 AG RESP QL IA.RAPID: NEGATIVE

## 2024-02-27 PROCEDURE — 1159F MED LIST DOCD IN RCRD: CPT | Mod: CPTII,,,

## 2024-02-27 PROCEDURE — 3074F SYST BP LT 130 MM HG: CPT | Mod: CPTII,,,

## 2024-02-27 PROCEDURE — 87426 SARSCOV CORONAVIRUS AG IA: CPT | Mod: RHCUB

## 2024-02-27 PROCEDURE — 1160F RVW MEDS BY RX/DR IN RCRD: CPT | Mod: CPTII,,,

## 2024-02-27 PROCEDURE — 96372 THER/PROPH/DIAG INJ SC/IM: CPT | Mod: ,,,

## 2024-02-27 PROCEDURE — 87804 INFLUENZA ASSAY W/OPTIC: CPT | Mod: RHCUB

## 2024-02-27 PROCEDURE — 3008F BODY MASS INDEX DOCD: CPT | Mod: CPTII,,,

## 2024-02-27 PROCEDURE — 99213 OFFICE O/P EST LOW 20 MIN: CPT | Mod: 25,,,

## 2024-02-27 PROCEDURE — 3078F DIAST BP <80 MM HG: CPT | Mod: CPTII,,,

## 2024-02-27 RX ORDER — NITROGLYCERIN 0.4 MG/1
1 TABLET SUBLINGUAL EVERY 5 MIN PRN
Qty: 30 TABLET | Refills: 1 | Status: CANCELLED | OUTPATIENT
Start: 2024-02-27

## 2024-02-27 RX ORDER — DULOXETIN HYDROCHLORIDE 30 MG/1
30 CAPSULE, DELAYED RELEASE ORAL DAILY
Qty: 90 CAPSULE | Refills: 1 | Status: CANCELLED | OUTPATIENT
Start: 2024-02-27 | End: 2025-02-26

## 2024-02-27 RX ORDER — KETOROLAC TROMETHAMINE 30 MG/ML
30 INJECTION, SOLUTION INTRAMUSCULAR; INTRAVENOUS
Status: COMPLETED | OUTPATIENT
Start: 2024-02-27 | End: 2024-02-27

## 2024-02-27 RX ORDER — CEFTRIAXONE 1 G/1
1 INJECTION, POWDER, FOR SOLUTION INTRAMUSCULAR; INTRAVENOUS
Status: COMPLETED | OUTPATIENT
Start: 2024-02-27 | End: 2024-02-27

## 2024-02-27 RX ORDER — AZITHROMYCIN 250 MG/1
TABLET, FILM COATED ORAL
Qty: 6 TABLET | Refills: 0 | Status: ON HOLD | OUTPATIENT
Start: 2024-02-27 | End: 2024-06-04 | Stop reason: HOSPADM

## 2024-02-27 RX ORDER — NITROGLYCERIN 0.4 MG/1
TABLET SUBLINGUAL
Status: CANCELLED | OUTPATIENT
Start: 2024-02-27

## 2024-02-27 RX ORDER — CETIRIZINE HYDROCHLORIDE 10 MG/1
10 TABLET ORAL DAILY
Qty: 30 TABLET | Refills: 1 | Status: SHIPPED | OUTPATIENT
Start: 2024-02-27 | End: 2025-02-26

## 2024-02-27 RX ORDER — FLUTICASONE PROPIONATE 50 MCG
1 SPRAY, SUSPENSION (ML) NASAL DAILY
Qty: 11.1 ML | Refills: 0 | Status: SHIPPED | OUTPATIENT
Start: 2024-02-27

## 2024-02-27 RX ADMIN — KETOROLAC TROMETHAMINE 30 MG: 30 INJECTION, SOLUTION INTRAMUSCULAR; INTRAVENOUS at 03:02

## 2024-02-27 RX ADMIN — CEFTRIAXONE 1 G: 1 INJECTION, POWDER, FOR SOLUTION INTRAMUSCULAR; INTRAVENOUS at 03:02

## 2024-02-27 NOTE — PATIENT INSTRUCTIONS
-Rocephin 1GM IM  -Z-pack complete until the antibiotics are all gone, even if you start to feel better.   -Flonase Daily  -Zertec Daily  -Use Olivia-pot at home.  -Increase fluid intake.   -Return to clinic if s/s persist or do not improve.   -Go to local ER if shortness of breath or chest pain occur.   -Make wellness exam for 2 weeks from now.     
The pt is a 28y y/o G 1 P0 @   40 weeks  dated by LMP  who presents to labor & delivery c/o  contractions with leaking fluid @ 630 am  + fetal movement, no vaginal bleeding

## 2024-02-27 NOTE — ASSESSMENT & PLAN NOTE
Has a lot of pressure due to sinusitis in the frontal sinus cavities. Will treat sinusitis but also Toradol IM for relief.

## 2024-02-27 NOTE — PROGRESS NOTES
HPI:   Reed Conde is a pleasant 63 y.o. patient who reports to clinic with complaints of pressure in head, coughing, sneezing since yesterday. He states he has had sinus issues in the past. He denies any fever that he is aware of.    Headache   This is a new problem. The current episode started today. The problem occurs constantly. The problem has been unchanged. The pain is located in the Bilateral region. The pain radiates to the face. The quality of the pain is described as aching. The pain is at a severity of 10/10. The pain is moderate. Associated symptoms include coughing, drainage, rhinorrhea, sinus pressure and a sore throat. Pertinent negatives include no dizziness or fever. Vomiting: sinus problem.Nothing aggravates the symptoms. He has tried nothing for the symptoms. The treatment provided no relief.   Sinusitis  This is a new problem. The current episode started yesterday. The problem is unchanged. There has been no fever. His pain is at a severity of 6/10. Associated symptoms include congestion, coughing, headaches, sinus pressure, sneezing and a sore throat. Pertinent negatives include no chills. Past treatments include nothing. The treatment provided no relief.                Past Medical History:   Diagnosis Date    Cardiomyopathy     Hypertension     Myocardial infarction 10/28/2015    Prolapse of cervical intervertebral disc without radiculopathy 09/10/2019       PAST SURGICAL HISTORY:   Past Surgical History:   Procedure Laterality Date    INSERTION OF BIVENTRICULAR IMPLANTABLE CARDIOVERTER-DEFIBRILLATOR (ICD)         MEDICATIONS:    Current Outpatient Medications:     atorvastatin (LIPITOR) 40 MG tablet, SMARTSI Tablet(s) By Mouth Every Evening, Disp: 90 tablet, Rfl: 1    carvediloL (COREG) 12.5 MG tablet, Take 1 tablet (12.5 mg total) by mouth 2 (two) times daily., Disp: 180 tablet, Rfl: 1    clopidogreL (PLAVIX) 75 mg tablet, Take 1 tablet (75 mg total) by mouth once daily., Disp: 90  tablet, Rfl: 1    nitroGLYCERIN (NITROSTAT) 0.4 MG SL tablet, , Disp: , Rfl:     sertraline (ZOLOFT) 100 MG tablet, Take 1 tablet (100 mg total) by mouth once daily., Disp: 90 tablet, Rfl: 1    spironolactone (ALDACTONE) 25 MG tablet, Take 1 tablet (25 mg total) by mouth once daily., Disp: 30 tablet, Rfl: 0    azithromycin (Z-FLAVIO) 250 MG tablet, Take 2 tablets by mouth on day 1; Take 1 tablet by mouth on days 2-5, Disp: 6 tablet, Rfl: 0    cetirizine (ZYRTEC) 10 MG tablet, Take 1 tablet (10 mg total) by mouth once daily., Disp: 30 tablet, Rfl: 1    DULoxetine (CYMBALTA) 30 MG capsule, Take 1 capsule (30 mg total) by mouth once daily. (Patient not taking: Reported on 2/27/2024), Disp: 90 capsule, Rfl: 1    fluticasone propionate (FLONASE) 50 mcg/actuation nasal spray, 1 spray (50 mcg total) by Each Nostril route once daily., Disp: 11.1 mL, Rfl: 0    furosemide (LASIX) 80 MG tablet, Take 1 tablet (80 mg total) by mouth once daily. (Patient not taking: Reported on 2/27/2024), Disp: 90 tablet, Rfl: 1    Current Facility-Administered Medications:     cefTRIAXone injection 1 g, 1 g, Intramuscular, 1 time in Clinic/HOD, Beatriz Mejia FNP    ketorolac injection 30 mg, 30 mg, Intramuscular, 1 time in Clinic/HOD, Beatriz Mejia FNP    ALLERGIES:   Review of patient's allergies indicates:  No Known Allergies      Review of Systems   Constitutional: Negative.  Negative for activity change, chills and fever.   HENT:  Positive for nasal congestion, postnasal drip, rhinorrhea, sinus pressure/congestion, sneezing and sore throat. Negative for drooling and nosebleeds.    Eyes: Negative.    Respiratory:  Positive for cough. Negative for chest tightness.    Cardiovascular: Negative.  Negative for chest pain and palpitations.   Gastrointestinal: Negative.  Vomiting: sinus problem.   Endocrine: Negative.    Genitourinary: Negative.  Negative for difficulty urinating.   Musculoskeletal: Negative.    Integumentary:  Negative.    Allergic/Immunologic: Negative.    Neurological:  Positive for headaches. Negative for dizziness.   Hematological: Negative.    Psychiatric/Behavioral: Negative.     All other systems reviewed and are negative.         Physical Exam  Constitutional:       General: He is not in acute distress.     Appearance: Normal appearance. He is well-developed. He is not ill-appearing.   HENT:      Head: Normocephalic and atraumatic.      Right Ear: Tympanic membrane normal.      Left Ear: Tympanic membrane normal.      Nose: Nasal tenderness and congestion present.      Right Sinus: Frontal sinus tenderness present.      Left Sinus: Frontal sinus tenderness present.      Mouth/Throat:      Mouth: Mucous membranes are moist.      Pharynx: Oropharynx is clear. No posterior oropharyngeal erythema.   Cardiovascular:      Rate and Rhythm: Normal rate and regular rhythm.      Pulses: Normal pulses.      Heart sounds: Normal heart sounds.   Pulmonary:      Effort: Pulmonary effort is normal. No accessory muscle usage or respiratory distress.      Breath sounds: Normal breath sounds.   Abdominal:      General: Abdomen is flat. Bowel sounds are normal. There is no distension.      Palpations: Abdomen is soft.      Tenderness: There is no abdominal tenderness.   Musculoskeletal:         General: Normal range of motion.      Cervical back: Normal range of motion.   Skin:     General: Skin is warm and dry.      Capillary Refill: Capillary refill takes less than 2 seconds.   Neurological:      Mental Status: He is alert and oriented to person, place, and time. Mental status is at baseline.   Psychiatric:         Mood and Affect: Mood normal.         Speech: Speech normal.         Behavior: Behavior normal. Behavior is cooperative.         Thought Content: Thought content normal.          VITAL SIGNS:   /62 (BP Location: Right arm, Patient Position: Sitting, BP Method: Large (Automatic))   Pulse 60   Temp 97.9 °F (36.6 °C) (Oral)   " Resp 20   Ht 5' 11" (1.803 m)   Wt 78.7 kg (173 lb 9.6 oz)   SpO2 100%   BMI 24.21 kg/m²       ASSESSMENT/PLAN  1. Nasal congestion  Assessment & Plan:  COVID, FLU, Strep Negative  See plan for sinusitis.   Increase fluid intake    Orders:  -     POCT Influenza A/B Rapid Antigen  -     SARS Coronavirus 2 Antigen, POCT    2. Acute non-recurrent frontal sinusitis  Assessment & Plan:  -Rocephin 1GM IM  -Z-pack complete until the antibiotics are all gone, even if you start to feel better.   -Flonase Daily  -Zertec Daily  -Use Olivia-pot at home.  -Increase fluid intake.   -Return to clinic if s/s persist or do not improve.   -Go to local ER if shortness of breath or chest pain occur.       Orders:  -     ketorolac injection 30 mg  -     cefTRIAXone injection 1 g  -     azithromycin (Z-FLAVIO) 250 MG tablet; Take 2 tablets by mouth on day 1; Take 1 tablet by mouth on days 2-5  Dispense: 6 tablet; Refill: 0  -     fluticasone propionate (FLONASE) 50 mcg/actuation nasal spray; 1 spray (50 mcg total) by Each Nostril route once daily.  Dispense: 11.1 mL; Refill: 0  -     cetirizine (ZYRTEC) 10 MG tablet; Take 1 tablet (10 mg total) by mouth once daily.  Dispense: 30 tablet; Refill: 1    3. Acute nonintractable headache, unspecified headache type  Assessment & Plan:  Has a lot of pressure due to sinusitis in the frontal sinus cavities. Will treat sinusitis but also Toradol IM for relief.                Patient Instructions   -Rocephin 1GM IM  -Z-pack complete until the antibiotics are all gone, even if you start to feel better.   -Flonase Daily  -Zertec Daily  -Use Olivia-pot at home.  -Increase fluid intake.   -Return to clinic if s/s persist or do not improve.   -Go to local ER if shortness of breath or chest pain occur.   -Make wellness exam for 2 weeks from now.     Orders Placed This Encounter   Procedures    POCT Influenza A/B Rapid Antigen    SARS Coronavirus 2 Antigen, POCT           "

## 2024-02-27 NOTE — ASSESSMENT & PLAN NOTE
-Rocephin 1GM IM  -Z-pack complete until the antibiotics are all gone, even if you start to feel better.   -Flonase Daily  -Zertec Daily  -Use Olivia-pot at home.  -Increase fluid intake.   -Return to clinic if s/s persist or do not improve.   -Go to local ER if shortness of breath or chest pain occur.

## 2024-04-08 ENCOUNTER — OFFICE VISIT (OUTPATIENT)
Dept: FAMILY MEDICINE | Facility: CLINIC | Age: 64
End: 2024-04-08
Payer: MEDICAID

## 2024-04-08 VITALS
WEIGHT: 175 LBS | HEIGHT: 71 IN | TEMPERATURE: 98 F | OXYGEN SATURATION: 98 % | RESPIRATION RATE: 18 BRPM | DIASTOLIC BLOOD PRESSURE: 51 MMHG | HEART RATE: 70 BPM | BODY MASS INDEX: 24.5 KG/M2 | SYSTOLIC BLOOD PRESSURE: 93 MMHG

## 2024-04-08 DIAGNOSIS — I42.9 CARDIOMYOPATHY, UNSPECIFIED TYPE: ICD-10-CM

## 2024-04-08 DIAGNOSIS — R73.9 HYPERGLYCEMIA: ICD-10-CM

## 2024-04-08 DIAGNOSIS — Z12.2 ENCOUNTER FOR SCREENING FOR LUNG CANCER: ICD-10-CM

## 2024-04-08 DIAGNOSIS — I10 PRIMARY HYPERTENSION: Primary | ICD-10-CM

## 2024-04-08 DIAGNOSIS — Z12.11 COLON CANCER SCREENING: ICD-10-CM

## 2024-04-08 DIAGNOSIS — F32.A DEPRESSION, UNSPECIFIED DEPRESSION TYPE: ICD-10-CM

## 2024-04-08 DIAGNOSIS — R73.03 PRE-DIABETES: ICD-10-CM

## 2024-04-08 LAB
ALBUMIN SERPL BCP-MCNC: 4.1 G/DL (ref 3.5–5)
ALBUMIN/GLOB SERPL: 1 {RATIO}
ALP SERPL-CCNC: 45 U/L (ref 45–115)
ALT SERPL W P-5'-P-CCNC: 30 U/L (ref 16–61)
ANION GAP SERPL CALCULATED.3IONS-SCNC: 9 MMOL/L (ref 7–16)
AST SERPL W P-5'-P-CCNC: 14 U/L (ref 15–37)
BASOPHILS # BLD AUTO: 0.04 K/UL (ref 0–0.2)
BASOPHILS NFR BLD AUTO: 0.6 % (ref 0–1)
BILIRUB SERPL-MCNC: 0.7 MG/DL (ref ?–1.2)
BUN SERPL-MCNC: 18 MG/DL (ref 7–18)
BUN/CREAT SERPL: 12 (ref 6–20)
CALCIUM SERPL-MCNC: 10.6 MG/DL (ref 8.5–10.1)
CHLORIDE SERPL-SCNC: 105 MMOL/L (ref 98–107)
CO2 SERPL-SCNC: 28 MMOL/L (ref 21–32)
CREAT SERPL-MCNC: 1.49 MG/DL (ref 0.7–1.3)
DIFFERENTIAL METHOD BLD: ABNORMAL
EGFR (NO RACE VARIABLE) (RUSH/TITUS): 53 ML/MIN/1.73M2
EOSINOPHIL # BLD AUTO: 0.11 K/UL (ref 0–0.5)
EOSINOPHIL NFR BLD AUTO: 1.7 % (ref 1–4)
ERYTHROCYTE [DISTWIDTH] IN BLOOD BY AUTOMATED COUNT: 15.5 % (ref 11.5–14.5)
EST. AVERAGE GLUCOSE BLD GHB EST-MCNC: 143 MG/DL
GLOBULIN SER-MCNC: 4.2 G/DL (ref 2–4)
GLUCOSE SERPL-MCNC: 130 MG/DL (ref 74–106)
HBA1C MFR BLD HPLC: 6.6 % (ref 4.5–6.6)
HCT VFR BLD AUTO: 46.9 % (ref 40–54)
HGB BLD-MCNC: 15 G/DL (ref 13.5–18)
IMM GRANULOCYTES # BLD AUTO: 0.01 K/UL (ref 0–0.04)
IMM GRANULOCYTES NFR BLD: 0.2 % (ref 0–0.4)
LYMPHOCYTES # BLD AUTO: 2.11 K/UL (ref 1–4.8)
LYMPHOCYTES NFR BLD AUTO: 32 % (ref 27–41)
MCH RBC QN AUTO: 27.7 PG (ref 27–31)
MCHC RBC AUTO-ENTMCNC: 32 G/DL (ref 32–36)
MCV RBC AUTO: 86.7 FL (ref 80–96)
MONOCYTES # BLD AUTO: 0.27 K/UL (ref 0–0.8)
MONOCYTES NFR BLD AUTO: 4.1 % (ref 2–6)
MPC BLD CALC-MCNC: 11 FL (ref 9.4–12.4)
NEUTROPHILS # BLD AUTO: 4.05 K/UL (ref 1.8–7.7)
NEUTROPHILS NFR BLD AUTO: 61.4 % (ref 53–65)
NRBC # BLD AUTO: 0 X10E3/UL
NRBC, AUTO (.00): 0 %
PLATELET # BLD AUTO: 167 K/UL (ref 150–400)
POTASSIUM SERPL-SCNC: 3.4 MMOL/L (ref 3.5–5.1)
PROT SERPL-MCNC: 8.3 G/DL (ref 6.4–8.2)
RBC # BLD AUTO: 5.41 M/UL (ref 4.6–6.2)
SODIUM SERPL-SCNC: 139 MMOL/L (ref 136–145)
WBC # BLD AUTO: 6.59 K/UL (ref 4.5–11)

## 2024-04-08 PROCEDURE — 3074F SYST BP LT 130 MM HG: CPT | Mod: CPTII,,,

## 2024-04-08 PROCEDURE — 83036 HEMOGLOBIN GLYCOSYLATED A1C: CPT | Mod: ,,, | Performed by: CLINICAL MEDICAL LABORATORY

## 2024-04-08 PROCEDURE — 85025 COMPLETE CBC W/AUTO DIFF WBC: CPT | Mod: ,,, | Performed by: CLINICAL MEDICAL LABORATORY

## 2024-04-08 PROCEDURE — 3078F DIAST BP <80 MM HG: CPT | Mod: CPTII,,,

## 2024-04-08 PROCEDURE — 4010F ACE/ARB THERAPY RXD/TAKEN: CPT | Mod: CPTII,,,

## 2024-04-08 PROCEDURE — 3008F BODY MASS INDEX DOCD: CPT | Mod: CPTII,,,

## 2024-04-08 PROCEDURE — 99214 OFFICE O/P EST MOD 30 MIN: CPT | Mod: ,,,

## 2024-04-08 PROCEDURE — 80053 COMPREHEN METABOLIC PANEL: CPT | Mod: ,,, | Performed by: CLINICAL MEDICAL LABORATORY

## 2024-04-08 PROCEDURE — 1160F RVW MEDS BY RX/DR IN RCRD: CPT | Mod: CPTII,,,

## 2024-04-08 PROCEDURE — 1159F MED LIST DOCD IN RCRD: CPT | Mod: CPTII,,,

## 2024-04-08 RX ORDER — CLOPIDOGREL BISULFATE 75 MG/1
75 TABLET ORAL DAILY
Qty: 90 TABLET | Refills: 1 | Status: ON HOLD | OUTPATIENT
Start: 2024-04-08 | End: 2024-06-04 | Stop reason: HOSPADM

## 2024-04-08 RX ORDER — DULOXETIN HYDROCHLORIDE 30 MG/1
30 CAPSULE, DELAYED RELEASE ORAL DAILY
Qty: 90 CAPSULE | Refills: 1 | Status: SHIPPED | OUTPATIENT
Start: 2024-04-08 | End: 2025-04-08

## 2024-04-08 RX ORDER — CARVEDILOL 12.5 MG/1
12.5 TABLET ORAL 2 TIMES DAILY
Qty: 180 TABLET | Refills: 1 | Status: SHIPPED | OUTPATIENT
Start: 2024-04-08 | End: 2024-10-05

## 2024-04-08 RX ORDER — SACUBITRIL AND VALSARTAN 97; 103 MG/1; MG/1
1 TABLET, FILM COATED ORAL 2 TIMES DAILY
COMMUNITY

## 2024-04-08 RX ORDER — HYDROCHLOROTHIAZIDE 25 MG/1
25 TABLET ORAL DAILY
COMMUNITY
End: 2024-04-08

## 2024-04-08 RX ORDER — FUROSEMIDE 80 MG/1
80 TABLET ORAL DAILY
Qty: 90 TABLET | Refills: 1 | Status: ON HOLD | OUTPATIENT
Start: 2024-04-08 | End: 2024-06-04 | Stop reason: HOSPADM

## 2024-04-08 RX ORDER — SPIRONOLACTONE 25 MG/1
25 TABLET ORAL DAILY
Qty: 90 TABLET | Refills: 1 | Status: SHIPPED | OUTPATIENT
Start: 2024-04-08

## 2024-04-08 RX ORDER — ATORVASTATIN CALCIUM 40 MG/1
TABLET, FILM COATED ORAL
Qty: 90 TABLET | Refills: 1 | Status: ON HOLD | OUTPATIENT
Start: 2024-04-08 | End: 2024-06-04 | Stop reason: HOSPADM

## 2024-04-08 NOTE — PROGRESS NOTES
HPI:   Reed Conde is a pleasant 63 y.o. patient who reports to clinic with complaints of medication refills and lab work. He states he is here to establish care today. He has a hx of cardiomyopathy, ICD placement, pre diabetes, htn, depression.  His blood pressure is slightly low on examination today. He is on three diuretics. He states that he follows Dr. Negrete for her heart. He has an ICD. He states he is going to make an an appt with Dr. Pickard this month. He denies any chest pain or shortness of breath at this time. He states that he did have some chest pain a couple of weeks ago that was sharp and it went away. He states he has some nitroglycerine that he uses as needed. He is no acute distress.    Medication Refill  This is a chronic problem. The current episode started more than 1 year ago. The problem occurs constantly. Pertinent negatives include no chest pain, chills or fever. Nothing aggravates the symptoms.                Past Medical History:   Diagnosis Date    Cardiomyopathy     Hypertension     Myocardial infarction 10/28/2015    Prolapse of cervical intervertebral disc without radiculopathy 09/10/2019       PAST SURGICAL HISTORY:   Past Surgical History:   Procedure Laterality Date    INSERTION OF BIVENTRICULAR IMPLANTABLE CARDIOVERTER-DEFIBRILLATOR (ICD)         MEDICATIONS:    Current Outpatient Medications:     nitroGLYCERIN (NITROSTAT) 0.4 MG SL tablet, , Disp: , Rfl:     sacubitriL-valsartan (ENTRESTO)  mg per tablet, Take 1 tablet by mouth 2 (two) times daily., Disp: , Rfl:     atorvastatin (LIPITOR) 40 MG tablet, SMARTSI Tablet(s) By Mouth Every Evening, Disp: 90 tablet, Rfl: 1    azithromycin (Z-FLAVIO) 250 MG tablet, Take 2 tablets by mouth on day 1; Take 1 tablet by mouth on days 2-5 (Patient not taking: Reported on 2024), Disp: 6 tablet, Rfl: 0    carvediloL (COREG) 12.5 MG tablet, Take 1 tablet (12.5 mg total) by mouth 2 (two) times daily., Disp: 180 tablet,  Rfl: 1    cetirizine (ZYRTEC) 10 MG tablet, Take 1 tablet (10 mg total) by mouth once daily., Disp: 30 tablet, Rfl: 1    clopidogreL (PLAVIX) 75 mg tablet, Take 1 tablet (75 mg total) by mouth once daily., Disp: 90 tablet, Rfl: 1    DULoxetine (CYMBALTA) 30 MG capsule, Take 1 capsule (30 mg total) by mouth once daily., Disp: 90 capsule, Rfl: 1    fluticasone propionate (FLONASE) 50 mcg/actuation nasal spray, 1 spray (50 mcg total) by Each Nostril route once daily., Disp: 11.1 mL, Rfl: 0    furosemide (LASIX) 80 MG tablet, Take 1 tablet (80 mg total) by mouth once daily., Disp: 90 tablet, Rfl: 1    spironolactone (ALDACTONE) 25 MG tablet, Take 1 tablet (25 mg total) by mouth once daily., Disp: 90 tablet, Rfl: 1    ALLERGIES:   Review of patient's allergies indicates:  No Known Allergies      Review of Systems   Constitutional: Negative.  Negative for activity change, chills and fever.   HENT: Negative.  Negative for drooling and nosebleeds.    Eyes: Negative.    Respiratory: Negative.  Negative for chest tightness.    Cardiovascular: Negative.  Negative for chest pain and palpitations.   Gastrointestinal: Negative.    Endocrine: Negative.    Genitourinary: Negative.  Negative for difficulty urinating.   Musculoskeletal: Negative.    Integumentary:  Negative.   Allergic/Immunologic: Negative.    Neurological: Negative.  Negative for dizziness.   Hematological: Negative.    Psychiatric/Behavioral: Negative.     All other systems reviewed and are negative.         Physical Exam  Constitutional:       General: He is not in acute distress.     Appearance: Normal appearance. He is well-developed. He is not ill-appearing.   HENT:      Head: Normocephalic and atraumatic.      Right Ear: Tympanic membrane normal.      Left Ear: Tympanic membrane normal.      Nose: Nose normal.      Mouth/Throat:      Mouth: Mucous membranes are moist.      Pharynx: Oropharynx is clear. No posterior oropharyngeal erythema.   Cardiovascular:     "  Rate and Rhythm: Normal rate and regular rhythm.      Pulses: Normal pulses.      Heart sounds: Normal heart sounds.   Pulmonary:      Effort: Pulmonary effort is normal. No accessory muscle usage or respiratory distress.      Breath sounds: Normal breath sounds.   Abdominal:      General: Abdomen is flat. Bowel sounds are normal. There is no distension.      Palpations: Abdomen is soft.      Tenderness: There is no abdominal tenderness.   Musculoskeletal:         General: Normal range of motion.      Cervical back: Normal range of motion.   Skin:     General: Skin is warm and dry.      Capillary Refill: Capillary refill takes less than 2 seconds.   Neurological:      Mental Status: He is alert and oriented to person, place, and time. Mental status is at baseline.   Psychiatric:         Mood and Affect: Mood normal.         Speech: Speech normal.         Behavior: Behavior normal. Behavior is cooperative.         Thought Content: Thought content normal.          VITAL SIGNS:   BP (!) 93/51 (BP Location: Left arm, Patient Position: Sitting)   Pulse 70   Temp 98.4 °F (36.9 °C)   Resp 18   Ht 5' 11" (1.803 m)   Wt 79.4 kg (175 lb)   SpO2 98%   BMI 24.41 kg/m²       ASSESSMENT/PLAN  1. Primary hypertension  Assessment & Plan:  -Take blood pressure once a day and write down on a blood pressure log. Return to the clinic in one week for a nurse blood pressue check and bring the blood pressure log and give to the nurse.   -Low salt, low sodium diet, less fried foods, more baked foods, more green leafy vegetables, more fruits, less bread  -CBC, CMP today. Will call with results.   -Exercise at least 30-45 minutes a day  -Follow up in 6 months.   Blood pressure is low today, 93/51- will d/c hydrochlorothiazide, as he was on 3 diuretics. He will keep log a week and return it    Orders:  -     CBC Auto Differential; Future; Expected date: 04/08/2024  -     Comprehensive Metabolic Panel; Future; Expected date: " 2024    2. Hyperglycemia    3. Cardiomyopathy, unspecified type  Assessment & Plan:  Follows Dr. Ochoa  Continue current medication, is effective at this time. Return to the clinic as needed.       Orders:  -     atorvastatin (LIPITOR) 40 MG tablet; SMARTSI Tablet(s) By Mouth Every Evening  Dispense: 90 tablet; Refill: 1  -     carvediloL (COREG) 12.5 MG tablet; Take 1 tablet (12.5 mg total) by mouth 2 (two) times daily.  Dispense: 180 tablet; Refill: 1  -     clopidogreL (PLAVIX) 75 mg tablet; Take 1 tablet (75 mg total) by mouth once daily.  Dispense: 90 tablet; Refill: 1  -     furosemide (LASIX) 80 MG tablet; Take 1 tablet (80 mg total) by mouth once daily.  Dispense: 90 tablet; Refill: 1  -     spironolactone (ALDACTONE) 25 MG tablet; Take 1 tablet (25 mg total) by mouth once daily.  Dispense: 90 tablet; Refill: 1    4. Depression, unspecified depression type  Assessment & Plan:  Continue current medication, is effective at this time. Return to the clinic as needed.     Orders:  -     DULoxetine (CYMBALTA) 30 MG capsule; Take 1 capsule (30 mg total) by mouth once daily.  Dispense: 90 capsule; Refill: 1    5. Colon cancer screening  Assessment & Plan:  Colonscopy orderd    Orders:  -     Colonoscopy; Future; Expected date: 2024    6. Encounter for screening for lung cancer  Assessment & Plan:  Low dose CT ordered     Orders:  -     CT Chest Lung Screening Low Dose; Future; Expected date: 2024    7. Pre-diabetes  Assessment & Plan:  A1c today    Orders:  -     Hemoglobin A1C; Future; Expected date: 2024             Patient Instructions   -Take blood pressure once a day and write down on a blood pressure log. Return to the clinic in one week for a nurse blood pressue check and bring the blood pressure log and give to the nurse.   -Low salt, low sodium diet, less fried foods, more baked foods, more green leafy vegetables, more fruits, less bread  -CBC, CMP today. Will call with  results.   -Exercise at least 30-45 minutes a day  -Follow up in 6 months.   -develops any shortness of breath, dizziness go to local ER  Orders Placed This Encounter   Procedures    CT Chest Lung Screening Low Dose     Standing Status:   Future     Standing Expiration Date:   4/8/2025     Order Specific Question:   Is there documentation of shared decision making for this lung screening exam?     Answer:   No     Order Specific Question:   Is the patient a current smoker?     Answer:   Yes     Order Specific Question:   Does the patient have a 20-pack/year or greater smoke history?     Answer:   Yes     Order Specific Question:   Is the patient between the ages 50-80 years old?     Answer:   Yes     Order Specific Question:   Does the patient show any signs or symptoms of lung cancer?     Answer:   No     Order Specific Question:   Is this the first (baseline) CT or an annual exam?     Answer:   Baseline [1]     Order Specific Question:   May the Radiologist modify the order per protocol to meet the clinical needs of the patient?     Answer:   Yes     Order Specific Question:   Is this a low dose screening chest CT?     Answer:   Yes    CBC Auto Differential     Standing Status:   Future     Number of Occurrences:   1     Standing Expiration Date:   7/4/2025    Comprehensive Metabolic Panel     Standing Status:   Future     Number of Occurrences:   1     Standing Expiration Date:   7/4/2025    Hemoglobin A1C     Standing Status:   Future     Number of Occurrences:   1     Standing Expiration Date:   7/7/2025    CBC with Differential

## 2024-04-08 NOTE — ASSESSMENT & PLAN NOTE
Follows Dr. Ochoa  Continue current medication, is effective at this time. Return to the clinic as needed.

## 2024-04-08 NOTE — ASSESSMENT & PLAN NOTE
-Take blood pressure once a day and write down on a blood pressure log. Return to the clinic in one week for a nurse blood pressue check and bring the blood pressure log and give to the nurse.   -Low salt, low sodium diet, less fried foods, more baked foods, more green leafy vegetables, more fruits, less bread  -CBC, CMP today. Will call with results.   -Exercise at least 30-45 minutes a day  -Follow up in 6 months.   Blood pressure is low today, 93/51- will d/c hydrochlorothiazide, as he was on 3 diuretics. He will keep log a week and return it

## 2024-04-08 NOTE — PATIENT INSTRUCTIONS
-Take blood pressure once a day and write down on a blood pressure log. Return to the clinic in one week for a nurse blood pressue check and bring the blood pressure log and give to the nurse.   -Low salt, low sodium diet, less fried foods, more baked foods, more green leafy vegetables, more fruits, less bread  -CBC, CMP today. Will call with results.   -Exercise at least 30-45 minutes a day  -Follow up in 6 months.   -develops any shortness of breath, dizziness go to local ER

## 2024-04-09 NOTE — PROGRESS NOTES
Potassium is 3.4, slightly low, eat foods high in potassium such as bananas, potatoes, sweet potatoes. He does have DM now. He seemed to have been pre diabetes a year ago. But he is DM now. Please schedule him an appt with Tanya Burton. We will try metformin twice a day at 500 mg with meals, 30 min before meals. If gives bad GI upset come back into the clinic. We will try something different. Follow up in 3 months for check on lab work. Increase fluid intake- his GFR (kidney function) is lower than normal. Increase fluid intake. Bring blood pressure log back in one week! Thanks!

## 2024-05-13 DIAGNOSIS — F17.210 CIGARETTE NICOTINE DEPENDENCE WITHOUT COMPLICATION: Primary | ICD-10-CM

## 2024-05-13 DIAGNOSIS — Z12.2 SCREENING FOR LUNG CANCER: ICD-10-CM

## 2024-05-31 PROCEDURE — 80053 COMPREHEN METABOLIC PANEL: CPT

## 2024-05-31 PROCEDURE — 83735 ASSAY OF MAGNESIUM: CPT

## 2024-05-31 PROCEDURE — 84484 ASSAY OF TROPONIN QUANT: CPT

## 2024-05-31 PROCEDURE — 99285 EMERGENCY DEPT VISIT HI MDM: CPT | Mod: 25

## 2024-05-31 PROCEDURE — 83605 ASSAY OF LACTIC ACID: CPT

## 2024-05-31 PROCEDURE — 96374 THER/PROPH/DIAG INJ IV PUSH: CPT

## 2024-05-31 PROCEDURE — 36415 COLL VENOUS BLD VENIPUNCTURE: CPT

## 2024-05-31 PROCEDURE — 85025 COMPLETE CBC W/AUTO DIFF WBC: CPT

## 2024-05-31 PROCEDURE — 85379 FIBRIN DEGRADATION QUANT: CPT

## 2024-05-31 PROCEDURE — 96375 TX/PRO/DX INJ NEW DRUG ADDON: CPT

## 2024-05-31 RX ORDER — FUROSEMIDE 10 MG/ML
40 INJECTION INTRAMUSCULAR; INTRAVENOUS
Status: DISCONTINUED | OUTPATIENT
Start: 2024-06-01 | End: 2024-06-01

## 2024-06-01 ENCOUNTER — HOSPITAL ENCOUNTER (INPATIENT)
Facility: HOSPITAL | Age: 64
LOS: 3 days | Discharge: HOME | End: 2024-06-04
Attending: INTERNAL MEDICINE | Admitting: INTERNAL MEDICINE
Payer: MEDICAID

## 2024-06-01 ENCOUNTER — HOSPITAL ENCOUNTER (EMERGENCY)
Facility: HOSPITAL | Age: 64
Discharge: ANOTHER HOSPITAL | End: 2024-06-01
Payer: MEDICAID

## 2024-06-01 VITALS
BODY MASS INDEX: 24.92 KG/M2 | OXYGEN SATURATION: 93 % | HEIGHT: 71 IN | HEART RATE: 78 BPM | WEIGHT: 178 LBS | SYSTOLIC BLOOD PRESSURE: 118 MMHG | DIASTOLIC BLOOD PRESSURE: 77 MMHG | TEMPERATURE: 98 F | RESPIRATION RATE: 15 BRPM

## 2024-06-01 DIAGNOSIS — R07.9 CHEST PAIN: ICD-10-CM

## 2024-06-01 DIAGNOSIS — I21.4 NSTEMI (NON-ST ELEVATED MYOCARDIAL INFARCTION): Primary | ICD-10-CM

## 2024-06-01 PROBLEM — E11.9 TYPE 2 DIABETES MELLITUS, WITHOUT LONG-TERM CURRENT USE OF INSULIN: Status: ACTIVE | Noted: 2024-06-01

## 2024-06-01 PROBLEM — R79.89 ELEVATED D-DIMER: Status: ACTIVE | Noted: 2024-06-01

## 2024-06-01 PROBLEM — I50.9 CHF (CONGESTIVE HEART FAILURE): Status: ACTIVE | Noted: 2024-06-01

## 2024-06-01 PROBLEM — I48.20 ATRIAL FIBRILLATION, CHRONIC: Status: ACTIVE | Noted: 2024-06-01

## 2024-06-01 LAB
ALBUMIN SERPL BCP-MCNC: 3.9 G/DL (ref 3.5–5)
ALBUMIN/GLOB SERPL: 1.1 {RATIO}
ALP SERPL-CCNC: 67 U/L (ref 45–115)
ALT SERPL W P-5'-P-CCNC: 25 U/L (ref 16–61)
ANION GAP SERPL CALCULATED.3IONS-SCNC: 15 MMOL/L (ref 7–16)
ANION GAP SERPL CALCULATED.3IONS-SCNC: 6 MMOL/L (ref 7–16)
AORTIC ROOT ANNULUS: 3.29 CM
AORTIC VALVE CUSP SEPERATION: 2.51 CM
APTT PPP: 27.8 SECONDS (ref 25.2–37.3)
AST SERPL W P-5'-P-CCNC: 20 U/L (ref 15–37)
AV INDEX (PROSTH): 0.21
AV MEAN GRADIENT: 6 MMHG
AV PEAK GRADIENT: 18 MMHG
AV REGURGITATION PRESSURE HALF TIME: 559.35 MS
AV VALVE AREA BY VELOCITY RATIO: 1.01 CM²
AV VALVE AREA: 0.89 CM²
AV VELOCITY RATIO: 0.24
BASOPHILS # BLD AUTO: 0.02 K/UL (ref 0–0.2)
BASOPHILS # BLD AUTO: 0.04 K/UL (ref 0–0.2)
BASOPHILS NFR BLD AUTO: 0.3 % (ref 0–1)
BASOPHILS NFR BLD AUTO: 0.6 % (ref 0–1)
BILIRUB SERPL-MCNC: 0.4 MG/DL (ref ?–1.2)
BILIRUB UR QL STRIP: NEGATIVE
BSA FOR ECHO PROCEDURE: 2.01 M2
BUN SERPL-MCNC: 11 MG/DL (ref 7–18)
BUN SERPL-MCNC: 13 MG/DL (ref 7–18)
BUN/CREAT SERPL: 12 (ref 6–20)
BUN/CREAT SERPL: 8 (ref 6–20)
CALCIUM SERPL-MCNC: 9.1 MG/DL (ref 8.5–10.1)
CALCIUM SERPL-MCNC: 9.5 MG/DL (ref 8.5–10.1)
CHLORIDE SERPL-SCNC: 102 MMOL/L (ref 98–107)
CHLORIDE SERPL-SCNC: 107 MMOL/L (ref 98–107)
CHOLEST SERPL-MCNC: 135 MG/DL (ref 0–200)
CHOLEST/HDLC SERPL: 1.9 {RATIO}
CLARITY UR: CLEAR
CO2 SERPL-SCNC: 29 MMOL/L (ref 21–32)
CO2 SERPL-SCNC: 30 MMOL/L (ref 21–32)
COLOR UR: NORMAL
CREAT SERPL-MCNC: 1.09 MG/DL (ref 0.7–1.3)
CREAT SERPL-MCNC: 1.36 MG/DL (ref 0.7–1.3)
CV ECHO LV RWT: 0.35 CM
D DIMER PPP FEU-MCNC: 0.91 ΜG/ML (ref 0–0.47)
DIFFERENTIAL METHOD BLD: ABNORMAL
DIFFERENTIAL METHOD BLD: ABNORMAL
DOP CALC AO PEAK VEL: 2.1 M/S
DOP CALC AO VTI: 35.9 CM
DOP CALC LVOT AREA: 4.2 CM2
DOP CALC LVOT DIAMETER: 2.3 CM
DOP CALC LVOT PEAK VEL: 0.51 M/S
DOP CALC LVOT STROKE VOLUME: 31.98 CM3
DOP CALCLVOT PEAK VEL VTI: 7.7 CM
E/E' RATIO: 8.74 M/S
ECHO LV POSTERIOR WALL: 1.14 CM (ref 0.6–1.1)
EGFR (NO RACE VARIABLE) (RUSH/TITUS): 59 ML/MIN/1.73M2
EGFR (NO RACE VARIABLE) (RUSH/TITUS): 77 ML/MIN/1.73M2
EJECTION FRACTION: 20 %
EOSINOPHIL # BLD AUTO: 0.22 K/UL (ref 0–0.5)
EOSINOPHIL # BLD AUTO: 0.26 K/UL (ref 0–0.5)
EOSINOPHIL NFR BLD AUTO: 2.8 % (ref 1–4)
EOSINOPHIL NFR BLD AUTO: 3.6 % (ref 1–4)
ERYTHROCYTE [DISTWIDTH] IN BLOOD BY AUTOMATED COUNT: 14.7 % (ref 11.5–14.5)
ERYTHROCYTE [DISTWIDTH] IN BLOOD BY AUTOMATED COUNT: 14.9 % (ref 11.5–14.5)
FRACTIONAL SHORTENING: 11 % (ref 28–44)
GLOBULIN SER-MCNC: 3.6 G/DL (ref 2–4)
GLUCOSE SERPL-MCNC: 116 MG/DL (ref 74–106)
GLUCOSE SERPL-MCNC: 95 MG/DL (ref 74–106)
GLUCOSE UR STRIP-MCNC: NORMAL MG/DL
HCT VFR BLD AUTO: 46.5 % (ref 40–54)
HCT VFR BLD AUTO: 49.2 % (ref 40–54)
HDLC SERPL-MCNC: 70 MG/DL (ref 40–60)
HGB BLD-MCNC: 15 G/DL (ref 13.5–18)
HGB BLD-MCNC: 15.8 G/DL (ref 13.5–18)
IMM GRANULOCYTES # BLD AUTO: 0.02 K/UL (ref 0–0.04)
IMM GRANULOCYTES NFR BLD: 0.3 % (ref 0–0.4)
INR BLD: 1.04
INTERVENTRICULAR SEPTUM: 0.95 CM (ref 0.6–1.1)
IVC DIAMETER: 1.07 CM
KETONES UR STRIP-SCNC: NEGATIVE MG/DL
LACTATE SERPL-SCNC: 0.9 MMOL/L (ref 0.4–2)
LDLC SERPL CALC-MCNC: 51 MG/DL
LEFT ATRIUM VOLUME INDEX MOD: 24.3 ML/M2
LEFT ATRIUM VOLUME MOD: 48.76 CM3
LEFT INTERNAL DIMENSION IN SYSTOLE: 5.83 CM (ref 2.1–4)
LEFT VENTRICLE DIASTOLIC VOLUME INDEX: 110.27 ML/M2
LEFT VENTRICLE DIASTOLIC VOLUME: 221.65 ML
LEFT VENTRICLE MASS INDEX: 152 G/M2
LEFT VENTRICLE SYSTOLIC VOLUME INDEX: 83.9 ML/M2
LEFT VENTRICLE SYSTOLIC VOLUME: 168.63 ML
LEFT VENTRICULAR INTERNAL DIMENSION IN DIASTOLE: 6.57 CM (ref 3.5–6)
LEFT VENTRICULAR MASS: 305 G
LEUKOCYTE ESTERASE UR QL STRIP: NEGATIVE
LV LATERAL E/E' RATIO: 7.55 M/S
LV SEPTAL E/E' RATIO: 10.38 M/S
LVOT MG: 0.62 MMHG
LVOT MV: 0.37 CM/S
LYMPHOCYTES # BLD AUTO: 1.96 K/UL (ref 1–4.8)
LYMPHOCYTES # BLD AUTO: 2.58 K/UL (ref 1–4.8)
LYMPHOCYTES NFR BLD AUTO: 27.2 % (ref 27–41)
LYMPHOCYTES NFR BLD AUTO: 32.8 % (ref 27–41)
MAGNESIUM SERPL-MCNC: 2.1 MG/DL (ref 1.7–2.3)
MCH RBC QN AUTO: 28.3 PG (ref 27–31)
MCH RBC QN AUTO: 28.7 PG (ref 27–31)
MCHC RBC AUTO-ENTMCNC: 32.1 G/DL (ref 32–36)
MCHC RBC AUTO-ENTMCNC: 32.3 G/DL (ref 32–36)
MCV RBC AUTO: 88 FL (ref 80–96)
MCV RBC AUTO: 88.9 FL (ref 80–96)
MONOCYTES # BLD AUTO: 0.46 K/UL (ref 0–0.8)
MONOCYTES # BLD AUTO: 0.52 K/UL (ref 0–0.8)
MONOCYTES NFR BLD AUTO: 6.4 % (ref 2–6)
MONOCYTES NFR BLD AUTO: 6.6 % (ref 2–6)
MPC BLD CALC-MCNC: 11.1 FL (ref 9.4–12.4)
MPC BLD CALC-MCNC: 11.7 FL (ref 9.4–12.4)
MV PEAK E VEL: 0.83 M/S
NEUTROPHILS # BLD AUTO: 4.46 K/UL (ref 1.8–7.7)
NEUTROPHILS # BLD AUTO: 4.53 K/UL (ref 1.8–7.7)
NEUTROPHILS NFR BLD AUTO: 57.5 % (ref 53–65)
NEUTROPHILS NFR BLD AUTO: 61.9 % (ref 53–65)
NITRITE UR QL STRIP: NEGATIVE
NONHDLC SERPL-MCNC: 65 MG/DL
NRBC # BLD AUTO: 0 X10E3/UL
NRBC, AUTO (.00): 0 %
NT-PROBNP SERPL-MCNC: 1693 PG/ML (ref 1–125)
OHS CV RV/LV RATIO: 0.49 CM
OHS QRS DURATION: 128 MS
OHS QTC CALCULATION: 507 MS
PH UR STRIP: 6 PH UNITS
PISA AR MAX VEL: 2.38 M/S
PISA MRMAX VEL: 4.8 M/S
PISA TR MAX VEL: 2.06 M/S
PLATELET # BLD AUTO: 142 K/UL (ref 150–400)
PLATELET # BLD AUTO: 144 K/UL (ref 150–400)
POTASSIUM SERPL-SCNC: 3 MMOL/L (ref 3.5–5.1)
POTASSIUM SERPL-SCNC: 3.1 MMOL/L (ref 3.5–5.1)
PROT SERPL-MCNC: 7.5 G/DL (ref 6.4–8.2)
PROT UR QL STRIP: NEGATIVE
PROTHROMBIN TIME: 13.5 SECONDS (ref 11.7–14.7)
PV PEAK GRADIENT: 3 MMHG
PV PEAK VELOCITY: 0.84 M/S
RA VOL SYS: 26.26 ML
RBC # BLD AUTO: 5.23 M/UL (ref 4.6–6.2)
RBC # BLD AUTO: 5.59 M/UL (ref 4.6–6.2)
RBC # UR STRIP: NEGATIVE /UL
RIGHT ATRIAL AREA: 11.4 CM2
RIGHT VENTRICLE DIASTOLIC LENGTH: 8 CM
RIGHT VENTRICLE DIASTOLIC MID DIMENSION: 2.4 CM
RIGHT VENTRICULAR END-DIASTOLIC DIMENSION: 3.2 CM
RIGHT VENTRICULAR LENGTH IN DIASTOLE (APICAL 4-CHAMBER VIEW): 8.23 CM
RV MID DIAMA: 2.45 CM
SODIUM SERPL-SCNC: 139 MMOL/L (ref 136–145)
SODIUM SERPL-SCNC: 144 MMOL/L (ref 136–145)
SP GR UR STRIP: 1.02
TDI LATERAL: 0.11 M/S
TDI SEPTAL: 0.08 M/S
TDI: 0.1 M/S
TR MAX PG: 17 MMHG
TRICUSPID ANNULAR PLANE SYSTOLIC EXCURSION: 2.46 CM
TRIGL SERPL-MCNC: 72 MG/DL (ref 35–150)
TROPONIN I SERPL DL<=0.01 NG/ML-MCNC: 488.8 PG/ML
TROPONIN I SERPL DL<=0.01 NG/ML-MCNC: 9470.5 PG/ML
UROBILINOGEN UR STRIP-ACNC: NORMAL MG/DL
VLDLC SERPL-MCNC: 14 MG/DL
WBC # BLD AUTO: 7.2 K/UL (ref 4.5–11)
WBC # BLD AUTO: 7.87 K/UL (ref 4.5–11)
Z-SCORE OF LEFT VENTRICULAR DIMENSION IN END DIASTOLE: 1.09
Z-SCORE OF LEFT VENTRICULAR DIMENSION IN END SYSTOLE: 3.78

## 2024-06-01 PROCEDURE — 36415 COLL VENOUS BLD VENIPUNCTURE: CPT

## 2024-06-01 PROCEDURE — 85730 THROMBOPLASTIN TIME PARTIAL: CPT

## 2024-06-01 PROCEDURE — 99223 1ST HOSP IP/OBS HIGH 75: CPT | Mod: ,,, | Performed by: INTERNAL MEDICINE

## 2024-06-01 PROCEDURE — 25000003 PHARM REV CODE 250

## 2024-06-01 PROCEDURE — 83880 ASSAY OF NATRIURETIC PEPTIDE: CPT

## 2024-06-01 PROCEDURE — 85025 COMPLETE CBC W/AUTO DIFF WBC: CPT

## 2024-06-01 PROCEDURE — 63600175 PHARM REV CODE 636 W HCPCS: Performed by: INTERNAL MEDICINE

## 2024-06-01 PROCEDURE — 93005 ELECTROCARDIOGRAM TRACING: CPT

## 2024-06-01 PROCEDURE — 93010 ELECTROCARDIOGRAM REPORT: CPT | Mod: 77,,, | Performed by: INTERNAL MEDICINE

## 2024-06-01 PROCEDURE — 11000001 HC ACUTE MED/SURG PRIVATE ROOM

## 2024-06-01 PROCEDURE — 81003 URINALYSIS AUTO W/O SCOPE: CPT

## 2024-06-01 PROCEDURE — 85610 PROTHROMBIN TIME: CPT

## 2024-06-01 PROCEDURE — 25000242 PHARM REV CODE 250 ALT 637 W/ HCPCS

## 2024-06-01 PROCEDURE — 63600175 PHARM REV CODE 636 W HCPCS

## 2024-06-01 PROCEDURE — 80061 LIPID PANEL: CPT

## 2024-06-01 PROCEDURE — 99285 EMERGENCY DEPT VISIT HI MDM: CPT | Mod: ,,,

## 2024-06-01 PROCEDURE — 80048 BASIC METABOLIC PNL TOTAL CA: CPT

## 2024-06-01 PROCEDURE — 94761 N-INVAS EAR/PLS OXIMETRY MLT: CPT

## 2024-06-01 PROCEDURE — 84484 ASSAY OF TROPONIN QUANT: CPT

## 2024-06-01 PROCEDURE — 93010 ELECTROCARDIOGRAM REPORT: CPT | Mod: ,,, | Performed by: INTERNAL MEDICINE

## 2024-06-01 RX ORDER — HEPARIN SODIUM,PORCINE/D5W 25000/250
0-40 INTRAVENOUS SOLUTION INTRAVENOUS CONTINUOUS
Status: DISCONTINUED | OUTPATIENT
Start: 2024-06-01 | End: 2024-06-01

## 2024-06-01 RX ORDER — HYDROCHLOROTHIAZIDE 25 MG/1
25 TABLET ORAL DAILY
Status: ON HOLD | COMMUNITY
End: 2024-06-04 | Stop reason: HOSPADM

## 2024-06-01 RX ORDER — HYDROMORPHONE HYDROCHLORIDE 2 MG/ML
1 INJECTION, SOLUTION INTRAMUSCULAR; INTRAVENOUS; SUBCUTANEOUS
Status: COMPLETED | OUTPATIENT
Start: 2024-06-01 | End: 2024-06-01

## 2024-06-01 RX ORDER — CARVEDILOL 12.5 MG/1
12.5 TABLET ORAL 2 TIMES DAILY
Status: DISCONTINUED | OUTPATIENT
Start: 2024-06-01 | End: 2024-06-04 | Stop reason: HOSPADM

## 2024-06-01 RX ORDER — ATORVASTATIN CALCIUM 80 MG/1
80 TABLET, FILM COATED ORAL NIGHTLY
Status: DISCONTINUED | OUTPATIENT
Start: 2024-06-01 | End: 2024-06-04 | Stop reason: HOSPADM

## 2024-06-01 RX ORDER — MORPHINE SULFATE 2 MG/ML
2 INJECTION, SOLUTION INTRAMUSCULAR; INTRAVENOUS EVERY 4 HOURS PRN
Status: DISCONTINUED | OUTPATIENT
Start: 2024-06-01 | End: 2024-06-04 | Stop reason: HOSPADM

## 2024-06-01 RX ORDER — HYDROCODONE BITARTRATE AND ACETAMINOPHEN 10; 325 MG/1; MG/1
1 TABLET ORAL EVERY 6 HOURS PRN
Status: DISCONTINUED | OUTPATIENT
Start: 2024-06-01 | End: 2024-06-04 | Stop reason: HOSPADM

## 2024-06-01 RX ORDER — NAPROXEN SODIUM 220 MG/1
81 TABLET, FILM COATED ORAL DAILY
Status: DISCONTINUED | OUTPATIENT
Start: 2024-06-01 | End: 2024-06-04 | Stop reason: HOSPADM

## 2024-06-01 RX ORDER — FUROSEMIDE 10 MG/ML
40 INJECTION INTRAMUSCULAR; INTRAVENOUS EVERY 12 HOURS
Status: DISCONTINUED | OUTPATIENT
Start: 2024-06-01 | End: 2024-06-04

## 2024-06-01 RX ORDER — NITROGLYCERIN 0.4 MG/1
0.4 TABLET SUBLINGUAL
Status: COMPLETED | OUTPATIENT
Start: 2024-06-01 | End: 2024-06-01

## 2024-06-01 RX ORDER — ENOXAPARIN SODIUM 100 MG/ML
1 INJECTION SUBCUTANEOUS EVERY 12 HOURS
Status: DISCONTINUED | OUTPATIENT
Start: 2024-06-01 | End: 2024-06-03

## 2024-06-01 RX ORDER — ONDANSETRON HYDROCHLORIDE 2 MG/ML
8 INJECTION, SOLUTION INTRAVENOUS EVERY 8 HOURS PRN
Status: DISCONTINUED | OUTPATIENT
Start: 2024-06-01 | End: 2024-06-04 | Stop reason: HOSPADM

## 2024-06-01 RX ORDER — NALOXONE HCL 0.4 MG/ML
0.02 VIAL (ML) INJECTION
Status: DISCONTINUED | OUTPATIENT
Start: 2024-06-01 | End: 2024-06-04 | Stop reason: HOSPADM

## 2024-06-01 RX ORDER — DULOXETIN HYDROCHLORIDE 30 MG/1
30 CAPSULE, DELAYED RELEASE ORAL DAILY
Status: DISCONTINUED | OUTPATIENT
Start: 2024-06-01 | End: 2024-06-04 | Stop reason: HOSPADM

## 2024-06-01 RX ORDER — MORPHINE SULFATE 4 MG/ML
2 INJECTION, SOLUTION INTRAMUSCULAR; INTRAVENOUS
Status: COMPLETED | OUTPATIENT
Start: 2024-06-01 | End: 2024-06-01

## 2024-06-01 RX ORDER — ONDANSETRON HYDROCHLORIDE 2 MG/ML
4 INJECTION, SOLUTION INTRAVENOUS
Status: COMPLETED | OUTPATIENT
Start: 2024-06-01 | End: 2024-06-01

## 2024-06-01 RX ORDER — CLOPIDOGREL BISULFATE 75 MG/1
75 TABLET ORAL DAILY
Status: DISCONTINUED | OUTPATIENT
Start: 2024-06-01 | End: 2024-06-04

## 2024-06-01 RX ORDER — SODIUM CHLORIDE 0.9 % (FLUSH) 0.9 %
10 SYRINGE (ML) INJECTION EVERY 12 HOURS PRN
Status: DISCONTINUED | OUTPATIENT
Start: 2024-06-01 | End: 2024-06-04 | Stop reason: HOSPADM

## 2024-06-01 RX ORDER — POTASSIUM CHLORIDE 20 MEQ/1
40 TABLET, EXTENDED RELEASE ORAL 2 TIMES DAILY
Status: COMPLETED | OUTPATIENT
Start: 2024-06-01 | End: 2024-06-01

## 2024-06-01 RX ORDER — ASPIRIN 325 MG
325 TABLET ORAL
Status: COMPLETED | OUTPATIENT
Start: 2024-06-01 | End: 2024-06-01

## 2024-06-01 RX ORDER — SPIRONOLACTONE 25 MG/1
25 TABLET ORAL DAILY
Status: DISCONTINUED | OUTPATIENT
Start: 2024-06-01 | End: 2024-06-04 | Stop reason: HOSPADM

## 2024-06-01 RX ADMIN — HEPARIN SODIUM 12 UNITS/KG/HR: 10000 INJECTION, SOLUTION INTRAVENOUS at 06:06

## 2024-06-01 RX ADMIN — ASPIRIN 325 MG: 325 TABLET ORAL at 12:06

## 2024-06-01 RX ADMIN — ONDANSETRON 4 MG: 2 INJECTION INTRAMUSCULAR; INTRAVENOUS at 12:06

## 2024-06-01 RX ADMIN — SACUBITRIL AND VALSARTAN 1 TABLET: 97; 103 TABLET, FILM COATED ORAL at 08:06

## 2024-06-01 RX ADMIN — ENOXAPARIN SODIUM 80 MG: 80 INJECTION SUBCUTANEOUS at 11:06

## 2024-06-01 RX ADMIN — POTASSIUM CHLORIDE 40 MEQ: 1500 TABLET, EXTENDED RELEASE ORAL at 09:06

## 2024-06-01 RX ADMIN — DULOXETINE HYDROCHLORIDE 30 MG: 30 CAPSULE, DELAYED RELEASE ORAL at 09:06

## 2024-06-01 RX ADMIN — SPIRONOLACTONE 25 MG: 25 TABLET ORAL at 09:06

## 2024-06-01 RX ADMIN — ENOXAPARIN SODIUM 80 MG: 80 INJECTION SUBCUTANEOUS at 12:06

## 2024-06-01 RX ADMIN — POTASSIUM CHLORIDE 40 MEQ: 1500 TABLET, EXTENDED RELEASE ORAL at 08:06

## 2024-06-01 RX ADMIN — NITROGLYCERIN 0.4 MG: 0.4 TABLET SUBLINGUAL at 12:06

## 2024-06-01 RX ADMIN — MORPHINE SULFATE 2 MG: 4 INJECTION, SOLUTION INTRAMUSCULAR; INTRAVENOUS at 12:06

## 2024-06-01 RX ADMIN — ASPIRIN 81 MG CHEWABLE TABLET 81 MG: 81 TABLET CHEWABLE at 09:06

## 2024-06-01 RX ADMIN — ATORVASTATIN CALCIUM 80 MG: 80 TABLET, FILM COATED ORAL at 08:06

## 2024-06-01 RX ADMIN — HYDROMORPHONE HYDROCHLORIDE 1 MG: 2 INJECTION, SOLUTION INTRAMUSCULAR; INTRAVENOUS; SUBCUTANEOUS at 01:06

## 2024-06-01 RX ADMIN — CARVEDILOL 12.5 MG: 12.5 TABLET, FILM COATED ORAL at 09:06

## 2024-06-01 RX ADMIN — SACUBITRIL AND VALSARTAN 1 TABLET: 97; 103 TABLET, FILM COATED ORAL at 09:06

## 2024-06-01 RX ADMIN — FUROSEMIDE 40 MG: 10 INJECTION, SOLUTION INTRAMUSCULAR; INTRAVENOUS at 09:06

## 2024-06-01 RX ADMIN — FUROSEMIDE 40 MG: 10 INJECTION, SOLUTION INTRAMUSCULAR; INTRAVENOUS at 08:06

## 2024-06-01 RX ADMIN — CLOPIDOGREL BISULFATE 75 MG: 75 TABLET ORAL at 09:06

## 2024-06-01 RX ADMIN — CARVEDILOL 12.5 MG: 12.5 TABLET, FILM COATED ORAL at 08:06

## 2024-06-01 NOTE — ASSESSMENT & PLAN NOTE
Recent A1c of 6.6  Not on any medication  Patient should be started on farxiga or Cardizem prior discharge

## 2024-06-01 NOTE — PLAN OF CARE
Problem: Adult Inpatient Plan of Care  Goal: Plan of Care Review  Outcome: Progressing  Goal: Patient-Specific Goal (Individualized)  Outcome: Progressing  Goal: Absence of Hospital-Acquired Illness or Injury  Outcome: Progressing  Goal: Optimal Comfort and Wellbeing  Outcome: Progressing  Goal: Readiness for Transition of Care  Outcome: Progressing     Problem: Suicide Risk  Goal: Absence of Self-Harm  Outcome: Progressing     Problem: Diabetes Comorbidity  Goal: Blood Glucose Level Within Targeted Range  Outcome: Progressing     Problem: Fall Injury Risk  Goal: Absence of Fall and Fall-Related Injury  Outcome: Progressing

## 2024-06-01 NOTE — LETTER
June 4, 2024          No Recipients                [address] [place] [id number]  Phone: [phone]  Fax: [phone]   Patient: Reed Conde   MR Number: [id number]   YOB: 1960   Date of Visit: 6/1/2024       Dear Dr. Frye Recipients:    Thank you for referring Reed Conde to me for evaluation. Below are the relevant portions of my assessment and plan of care.            If you have questions, please do not hesitate to call me. I look forward to following Reed along with you.    Sincerely,      Sonia Campbell, RN           CC    No Recipients

## 2024-06-01 NOTE — HPI
63 year old male presented to the [place] ED with chest pain. Chest pain started around 6-7 pm yesterday. Patient was laying on the couch when it started. It was intermittent at first and then became constant so he went to the ED. Pain is on the left side, pressure like, doesn't radiate. First time since 2009 he is having chest pain. No cough but SOB, no edema in the legs. No palpitation at this moment but patient reports he does have chronic intermitten palpitations at times.      CAD s/p 3 stents back in 2009 and had an ICD place back in 2010. Dr. Patterson at [facility] did stenting and he has not seen him since, and Dr. Fitzgerald did ICD. Patient hasn't seen Dr. Fitzgerald since 3 years ago. PCP writes medications, ADITYA Mejia. Patient reports he takes his medication and is compliant.   Last stress test was done 2018. Patient reports he has A fib but he is not on any anticoagulation. Smoker with 20 pack year Hx.

## 2024-06-01 NOTE — ASSESSMENT & PLAN NOTE
Wells score is 0  D dimer was done in the ED and it is elevated at 0.9  Patient's Cr is elevated  Starting him on heparin drip regardless for NSTEMI  Will do LE doppler  CTA or V/Q based on Cr when appropriate

## 2024-06-01 NOTE — ASSESSMENT & PLAN NOTE
No signs or symptoms of CHF. Chest xray has clear lungs.  Patient takes Lasix 80 mg PO at home daily  Will start him on Lasix 40 mg BID while at the hospital  Continue home meds  Patient has ICD placed back in 2010  Echo pending  Cardiology consulted  Weight daily

## 2024-06-01 NOTE — ASSESSMENT & PLAN NOTE
ROBERTO CARLOS score: 5 points  EKG was done and there is no ST elevation. There are some abnormalities on the EKG will get another one.   Troponin >400. Second one is pending  Started patient on Heparin drip  Continue home Coreg and Entresto  Holding home HCTZ for now  Increased home lipitor 40 mg to 80 mg  Continue aldactone 25 mg  Continue home plavix  Patient received Aspirin 325 in ED will start him on aspirin 81 daily  Morphine and oxygen as needed  Cardiac monitoring  Echo pending  Cardiology consulted

## 2024-06-01 NOTE — ASSESSMENT & PLAN NOTE
- Patient is without shortness of breath and CXR with no acute findings  - Pending Echo  - Continue GDMT through Entresto, BB, Spironolactone, and Lasix  - Supplemental oxygen PRN  - Daily weight with strict Intake and output   - Reduce salt to about 2g daily and fluid intake to about 2L  - Telemetry monitoring    Plan for [facility] on Monday to NSTEMI

## 2024-06-01 NOTE — HPI
63 year old male presented to the [place] ED with chest pain. Chest pain started around 6-7 pm yesterday. Patient was laying on the couch when it started. It was intermittent at first and then became constant so he went to the ED. Pain is on the left side, pressure like, doesn't radiate. First time since 2009 he is having chest pain. No cough but SOB, no edema in the legs. No palpitation at this moment but patient reports he does have chronic intermitten palpitations at times.     CAD s/p 3 stents back in 2009 and had an ICD place back in 2010. Dr. Patterson at [facility] did stenting and he has not seen him since, and Dr. Fitzgerald did ICD. Patient hasn't seen Dr. Fitzgerald since 3 years ago. PCP writes medications, ADITYA Mejia. Patient reports he takes his medication and is compliant.   Last stress test was done 2018. Patient reports he has A fib but he is not on any anticoagulation. Smoker with 20 pack year Hx.     ED course:  Elevated troponin  Abnormal EKG but no st elevations  Received aspirin 325 in the ED.

## 2024-06-01 NOTE — CONSULTS
[facility] - [facility] Medical Telemetry  Cardiology  Consult Note    Patient Name: Reed Conde  MRN: [id number]  Admission Date: 6/1/2024  Hospital Length of Stay: 0 days  Code Status: Full Code   Attending Provider: Holger Peterson MD   Consulting Provider: Henrik Mcarthur MD  Primary Care Physician: Beatriz Mejia FNP  Principal Problem:NSTEMI (non-ST elevated myocardial infarction)    Patient information was obtained from patient, ER records, and primary team.     Consults  Subjective:     Chief Complaint:  Chest pain    HPI:   63 year old male presented to the [place] ED with chest pain. Chest pain started around 6-7 pm yesterday. Patient was laying on the couch when it started. It was intermittent at first and then became constant so he went to the ED. Pain is on the left side, pressure like, doesn't radiate. First time since 2009 he is having chest pain. No cough but SOB, no edema in the legs. No palpitation at this moment but patient reports he does have chronic intermitten palpitations at times.      CAD s/p 3 stents back in 2009 and had an ICD place back in 2010. Dr. Patterson at [facility] did stenting and he has not seen him since, and Dr. Fitzgerald did ICD. Patient hasn't seen Dr. Fitzgerald since 3 years ago. PCP writes medications, ADITYA Mejia. Patient reports he takes his medication and is compliant.   Last stress test was done 2018. Patient reports he has A fib but he is not on any anticoagulation. Smoker with 20 pack year Hx.     Past Medical History:   Diagnosis Date    Cardiomyopathy     Hypertension     Mixed hyperlipidemia     Myocardial infarction 10/28/2015    Prolapse of cervical intervertebral disc without radiculopathy 09/10/2019       Past Surgical History:   Procedure Laterality Date    INSERTION OF BIVENTRICULAR IMPLANTABLE CARDIOVERTER-DEFIBRILLATOR (ICD)  2019       Review of patient's allergies indicates:  No Known Allergies    Current Facility-Administered Medications on File  Prior to Encounter   Medication    [COMPLETED] aspirin tablet 325 mg    [COMPLETED] HYDROmorphone (PF) injection 1 mg    [COMPLETED] morphine injection 2 mg    [COMPLETED] nitroGLYCERIN SL tablet 0.4 mg    [COMPLETED] ondansetron injection 4 mg    [DISCONTINUED] furosemide injection 40 mg     Current Outpatient Medications on File Prior to Encounter   Medication Sig    atorvastatin (LIPITOR) 40 MG tablet SMARTSI Tablet(s) By Mouth Every Evening    azithromycin (Z-FLAVIO) 250 MG tablet Take 2 tablets by mouth on day 1; Take 1 tablet by mouth on days 2-5 (Patient not taking: Reported on 2024)    carvediloL (COREG) 12.5 MG tablet Take 1 tablet (12.5 mg total) by mouth 2 (two) times daily.    cetirizine (ZYRTEC) 10 MG tablet Take 1 tablet (10 mg total) by mouth once daily.    clopidogreL (PLAVIX) 75 mg tablet Take 1 tablet (75 mg total) by mouth once daily.    DULoxetine (CYMBALTA) 30 MG capsule Take 1 capsule (30 mg total) by mouth once daily.    fluticasone propionate (FLONASE) 50 mcg/actuation nasal spray 1 spray (50 mcg total) by Each Nostril route once daily.    furosemide (LASIX) 80 MG tablet Take 1 tablet (80 mg total) by mouth once daily.    hydroCHLOROthiazide (HYDRODIURIL) 25 MG tablet Take 25 mg by mouth once daily.    nitroGLYCERIN (NITROSTAT) 0.4 MG SL tablet     sacubitriL-valsartan (ENTRESTO)  mg per tablet Take 1 tablet by mouth 2 (two) times daily.    spironolactone (ALDACTONE) 25 MG tablet Take 1 tablet (25 mg total) by mouth once daily.     Family History       Problem Relation (Age of Onset)    Alcohol abuse Father    Diabetes Mother, Brother    Heart disease Mother    Heart failure Mother    Hypertension Mother    Stomach cancer Brother          Tobacco Use    Smoking status: Every Day     Current packs/day: 0.50     Average packs/day: 0.5 packs/day for 47.4 years (23.7 ttl pk-yrs)     Types: Cigarettes     Start date:     Smokeless tobacco: Never   Substance and Sexual Activity     Alcohol use: Not Currently    Drug use: Yes     Types: Marijuana, Cocaine    Sexual activity: Yes     Partners: Male     Review of Systems   Constitutional: Negative for chills.   Endocrine: Negative for cold intolerance.     Objective:     Vital Signs (Most Recent):  Temp: 97.8 °F (36.6 °C) (06/01/24 1120)  Pulse: 81 (06/01/24 1120)  Resp: 18 (06/01/24 1120)  BP: 93/71 (06/01/24 1120)  SpO2: (!) 93 % (06/01/24 1120) Vital Signs (24h Range):  Temp:  [97.4 °F (36.3 °C)-97.9 °F (36.6 °C)] 97.8 °F (36.6 °C)  Pulse:  [63-88] 81  Resp:  [15-22] 18  SpO2:  [92 %-100 %] 93 %  BP: ()/(71-95) 93/71     Weight: 80.7 kg (178 lb)  Body mass index is 24.83 kg/m².    SpO2: (!) 93 %         Intake/Output Summary (Last 24 hours) at 6/1/2024 1405  Last data filed at 6/1/2024 1207  Gross per 24 hour   Intake 73.27 ml   Output 600 ml   Net -526.73 ml       Lines/Drains/Airways       Peripheral Intravenous Line  Duration                  Peripheral IV - Single Lumen 06/01/24 20 G Right Antecubital <1 day                     Physical Exam  Neurological:      Mental Status: He is alert.          Significant Labs:   Recent Lab Results         06/01/24  0440   06/01/24  0005   05/31/24  2358        Albumin/Globulin Ratio     1.1       Albumin     3.9       ALP     67       ALT     25       Anion Gap 6     15       PTT 27.8           AST     20       Baso # 0.04     0.02       Basophil % 0.6     0.3       BILIRUBIN TOTAL     0.4       BUN 13     11       BUN/CREAT RATIO 12     8       Calcium 9.1     9.5       Chloride 107     102       CHOL/HDLC Ratio 1.9           Cholesterol Total 135  Comment:   <200 mg/dL:  Desirable  200-240 mg/dL: Borderline High  >240 mg/dL:  High           CO2 29     30       Creatinine 1.09     1.36       D-Dimer     0.91       Differential Method Auto     Auto       eGFR 77     59       Eos # 0.26     0.22       Eos % 3.6     2.8       Globulin, Total     3.6       Glucose 116     95       HDL 70  Comment:    <40 mg/dL: Low HDL  40-60 mg/dL: Normal  >60 mg/dL: Desirable           Hematocrit 46.5     49.2       Hemoglobin 15.0     15.8       Immature Grans (Abs) 0.02           Immature Granulocytes 0.3           INR 1.04           Lactic Acid Level     0.9       LDL Calculated 51  Comment: Unable to calculate due to one of the following values:  Cholesterol <5  HDL Cholesterol <5  Triglycerides <10 or >400           Lymph # 1.96     2.58       Lymph % 27.2     32.8       Magnesium      2.1       MCH 28.7     28.3       MCHC 32.3     32.1       MCV 88.9     88.0       Mono # 0.46     0.52       Mono % 6.4     6.6       MPV 11.1     11.7       Neutrophils, Abs 4.46     4.53       Neutrophils Relative 61.9     57.5       Non-HDL Cholesterol 65           nRBC 0.0           NT-proBNP 1,693           NUCLEATED RBC ABSOLUTE 0.00           QRS Duration   128         OHS QTC Calculation   507         Platelet Count 144     142       Potassium 3.0     3.1       PROTEIN TOTAL     7.5       PT 13.5           RBC 5.23     5.59       RDW 14.7     14.9       Sodium 139     144       Triglycerides 72  Comment:   Normal:  <150 mg/dL  Borderline High: 150-199 mg/dL  High:   200-499 mg/dL  Very High:  >=500           Troponin I High Sensitivity 9,470.5     488.8       VLDL Cholesterol Anshu 14           WBC 7.20     7.87               Significant Imaging: Echocardiogram: Transthoracic echo (TTE) complete (Cupid Only):   Results for orders placed or performed during the hospital encounter of 06/01/24   Echo   Result Value Ref Range    BSA 2.01 m2    LVOT stroke volume 31.98 cm3    LVIDd 6.57 (A) 3.5 - 6.0 cm    LV Systolic Volume 168.63 mL    LV Systolic Volume Index 83.9 mL/m2    LVIDs 5.83 (A) 2.1 - 4.0 cm    LV Diastolic Volume 221.65 mL    LV Diastolic Volume Index 110.27 mL/m2    IVS 0.95 0.6 - 1.1 cm    LVOT diameter 2.30 cm    LVOT area 4.2 cm2    FS 11 (A) 28 - 44 %    Left Ventricle Relative Wall Thickness 0.35 cm    Posterior Wall  1.14 (A) 0.6 - 1.1 cm    LV mass 305.00 g    LV Mass Index 152 g/m2    MV Peak E Fer 0.83 m/s    TDI LATERAL 0.11 m/s    TDI SEPTAL 0.08 m/s    E/E' ratio 8.74 m/s    TR Max Fer 2.06 m/s    LV SEPTAL E/E' RATIO 10.38 m/s    LV LATERAL E/E' RATIO 7.55 m/s    LVOT peak fer 0.51 m/s    Left Ventricular Outflow Tract Mean Velocity 0.37 cm/s    Left Ventricular Outflow Tract Mean Gradient 0.62 mmHg    Right ventricular length in diastole (apical 4-chamber view) 8.23 cm    RV mid diameter 2.45 cm    TAPSE 2.46 cm    LA volume (mod) 48.76 cm3    LA Volume Index (Mod) 24.3 mL/m2    RA area 11.4 cm2    Right Atrium Volume Systolic 26.26 mL    AV regurgitation pressure 1/2 time [id number] ms    AR Max Fer 2.38 m/s    AV mean gradient 6 mmHg    AV peak gradient 18 mmHg    Ao peak fer 2.10 m/s    Ao VTI 35.90 cm    LVOT peak VTI 7.70 cm    AV valve area 0.89 cm²    AV Velocity Ratio 0.24     AV index (prosthetic) 0.21     RICK by Velocity Ratio 1.01 cm²    Mr max fer 4.80 m/s    Triscuspid Valve Regurgitation Peak Gradient 17 mmHg    PV PEAK VELOCITY 0.84 m/s    PV peak gradient 3 mmHg    Ao root annulus 3.29 cm    IVC diameter 1.07 cm    Mean e' 0.10 m/s    ZLVIDS 3.78     ZLVIDD 1.09     AORTIC VALVE CUSP SEPERATION 2.51 cm    RVDD 3.20 cm    RV-healy mid d 2.4 cm    RV-healy length 8.0 cm    RV/LV Ratio 0.49 cm    and X-Ray: CXR: X-Ray Chest 1 View (CXR): No results found for this visit on 06/01/24. and X-Ray Chest PA and Lateral (CXR): No results found for this visit on 06/01/24.  Assessment and Plan:     * NSTEMI (non-ST elevated myocardial infarction)  - ROBERTO CARLOS score of 5 with 26 % of all-cause mortality and Heart Score with moderate risk  - Troponin of 9470 from 488 and EKG with no ST elevation   - Telemetry monitoring  - Continue ASA, BB, Statin and Lovenox  - Pending Echo  - Supplemental Oxygen and nitroglycerin    Plan for [facility] on Monday      CHF (congestive heart failure)  - Patient is without shortness of breath  and CXR with no acute findings  - Pending Echo  - Continue GDMT through Entresto, BB, Spironolactone, and Lasix  - Supplemental oxygen PRN  - Daily weight with strict Intake and output   - Reduce salt to about 2g daily and fluid intake to about 2L  - Telemetry monitoring    Plan for [facility] on Monday to NSTEMI    Atrial fibrillation, chronic  Patient with history of Afib and follows with cardiology at [facility] but is not on any AC. It is unsure why the patient is not on anticoagulation. We will need to get medical record from  his cardiologist to find out why he is not on anticoagulation.     Pending Echo and continue with BB and Lovenox for now as per NSTEMI protocol          Type 2 diabetes mellitus, without long-term current use of insulin  Manage by the primary team    Hypokalemia  Being managed by primary team        VTE Risk Mitigation (From admission, onward)           Ordered     enoxaparin injection 80 mg  Every 12 hours         06/01/24 1053     IP VTE LOW RISK PATIENT  Once         06/01/24 0420     Place sequential compression device  Until discontinued         06/01/24 0420                    Thank you for your consult. I will follow-up with patient. Please contact us if you have any additional questions.    Guillermo Roy MD  Cardiology   [facility] - [address] [place] [address]

## 2024-06-01 NOTE — ED TRIAGE NOTES
"Rec'd ambulatory 64 y/o M to ED#3 w/ c/o left CP, SOB- onset about 1 hour PTA, BLE edema. Denied radiation of pain, N/V. HX: ICD Left chest. Has not seen cardiologist - Dr. De La Rosa - in 3 years. Reports daily marijuana use and occasional cocaine use (eliezer 2 x/month).   Pt endorses suicidal thoughts "from time to time" and to "just get it over with." No plan in place. Consults w/ his  routinely.   "

## 2024-06-01 NOTE — ED PROVIDER NOTES
"Encounter Date: 5/31/2024       History     Chief Complaint   Patient presents with    Chest Pain     Patient is a 64 y/o AAM with a PMHx of HTN, MI and cardiomyopathy present to the ED POV with c/o chest pain. Patient stated that his chest pain started 2 hours prior to arrival. Patient reports his pain as "pressure" that does not move or radiates. Patient denies any nausea, vomiting, and/or shortness of breath. Patient has an ICD in place.         Review of patient's allergies indicates:  No Known Allergies  Past Medical History:   Diagnosis Date    Cardiomyopathy     Hypertension     Mixed hyperlipidemia     Myocardial infarction 10/28/2015    Prolapse of cervical intervertebral disc without radiculopathy 09/10/2019     Past Surgical History:   Procedure Laterality Date    INSERTION OF BIVENTRICULAR IMPLANTABLE CARDIOVERTER-DEFIBRILLATOR (ICD)  2019     Family History   Problem Relation Name Age of Onset    Heart disease Mother      Diabetes Mother      Hypertension Mother      Heart failure Mother      Alcohol abuse Father      Diabetes Brother      Stomach cancer Brother       Social History     Tobacco Use    Smoking status: Every Day     Current packs/day: 0.50     Average packs/day: 0.5 packs/day for 47.4 years (23.7 ttl pk-yrs)     Types: Cigarettes     Start date: 1977    Smokeless tobacco: Never   Substance Use Topics    Alcohol use: Not Currently    Drug use: Yes     Types: Marijuana, Cocaine     Review of Systems   Constitutional: Negative.    HENT: Negative.     Eyes: Negative.    Respiratory:  Positive for shortness of breath.    Cardiovascular:  Positive for chest pain.   Gastrointestinal: Negative.    Endocrine: Negative.    Genitourinary: Negative.    Musculoskeletal: Negative.    Skin: Negative.    Allergic/Immunologic: Negative.    Neurological: Negative.    Hematological: Negative.    Psychiatric/Behavioral: Negative.         Physical Exam     Initial Vitals [06/01/24 0014]   BP Pulse Resp Temp " SpO2   (!) 141/91 88 20 97.9 °F (36.6 °C) 100 %      MAP       --         Physical Exam    Nursing note and vitals reviewed.  Constitutional: Vital signs are normal. He appears well-developed and well-nourished. He is cooperative. He appears ill.   Neck: Trachea normal and phonation normal. Neck supple. No thyroid mass and no thyromegaly present. No stridor present. No tracheal tenderness present. No tracheal deviation present.   Normal range of motion.   Full passive range of motion without pain.     Cardiovascular:  Normal rate, regular rhythm, S1 normal, S2 normal, normal heart sounds, intact distal pulses and normal pulses.     Exam reveals no gallop, no S3, no S4, no distant heart sounds and no friction rub.       No murmur heard.  No systolic murmur is present.  No diastolic murmur is present.      Pulmonary/Chest: Effort normal and breath sounds normal.   Musculoskeletal:      Cervical back: Full passive range of motion without pain, normal range of motion and neck supple.     Lymphadenopathy:     He has no cervical adenopathy.     He has no axillary adenopathy.   Neurological: He is alert and oriented to person, place, and time. He has normal strength and normal reflexes. He displays normal reflexes. No cranial nerve deficit or sensory deficit. He displays a negative Romberg sign. GCS eye subscore is 4. GCS verbal subscore is 5. GCS motor subscore is 6.   Skin: Skin is warm, dry and intact. Capillary refill takes less than 2 seconds. No rash noted.   Psychiatric: He has a normal mood and affect. His speech is normal and behavior is normal. Judgment and thought content normal. Cognition and memory are normal.         Medical Screening Exam   See Full Note    ED Course   Procedures  Labs Reviewed   COMPREHENSIVE METABOLIC PANEL - Abnormal; Notable for the following components:       Result Value    Potassium 3.1 (*)     Creatinine 1.36 (*)     eGFR 59 (*)     All other components within normal limits    TROPONIN I - Abnormal; Notable for the following components:    Troponin I High Sensitivity 488.8 (*)     All other components within normal limits   CBC WITH DIFFERENTIAL - Abnormal; Notable for the following components:    RDW 14.9 (*)     Platelet Count 142 (*)     Monocytes % 6.6 (*)     All other components within normal limits   D DIMER, QUANTITATIVE - Abnormal; Notable for the following components:    D-Dimer 0.91 (*)     All other components within normal limits   MAGNESIUM - Normal   LACTIC ACID, PLASMA - Normal   CBC W/ AUTO DIFFERENTIAL    Narrative:     The following orders were created for panel order CBC auto differential.  Procedure                               Abnormality         Status                     ---------                               -----------         ------                     CBC with Differential[[id number]]       Abnormal            Final result                 Please view results for these tests on the individual orders.   URINALYSIS, REFLEX TO URINE CULTURE   DRUG SCREEN, URINE (BEAKER)     EKG Readings: (Independently Interpreted)   Initial Reading: No STEMI. Rhythm: Normal Sinus Rhythm. Heart Rate: 82. Ectopy: No Ectopy. Conduction: Normal. ST Segments: Normal ST Segments. Axis: Normal.     ECG Results              EKG 12-lead (In process)        Collection Time Result Time QRS Duration OHS QTC Calculation    06/01/24 00:05:38 06/01/24 00:11:13 128 507                     In process by Interface, Lab In [facility] (06/01/24 00:11:18)                   Narrative:    Test Reason : R07.9,    Vent. Rate : 082 BPM     Atrial Rate : 082 BPM     P-R Int : 164 ms          QRS Dur : 128 ms      QT Int : 434 ms       P-R-T Axes : 056 -57 265 degrees     QTc Int : 507 ms    Sinus rhythm with frequent Premature ventricular complexes  Right bundle branch block  Left anterior fascicular block   Bifascicular block   Voltage criteria for left ventricular hypertrophy  Anteroseptal infarct (cited  "on or before 01-JUN-2024)  Marked T-wave abnormality, consider inferolateral ischemia  Abnormal ECG  When compared with ECG of 01-JUN-2024 00:04,  Serial changes of Anteroseptal infarct Present    Referred By: [email or web address]   SELF           Confirmed By:                                   Imaging Results              X-Ray Chest 1 View (In process)                      Medications   aspirin tablet 325 mg (325 mg Oral Given 6/1/24 0027)   nitroGLYCERIN SL tablet 0.4 mg (0.4 mg Sublingual Given 6/1/24 0028)   morphine injection 2 mg (2 mg Intravenous Given 6/1/24 0030)   ondansetron injection 4 mg (4 mg Intravenous Given 6/1/24 0029)     Medical Decision Making  Patient is a 64 y/o AAM with a PMHx of HTN, MI and cardiomyopathy present to the ED POV with c/o chest pain. Patient stated that his chest pain started 2 hours prior to arrival. Patient reports his pain as "pressure" that does not move or radiates. Patient denies any nausea, vomiting, and/or shortness of breath. Patient has an ICD in place.           Amount and/or Complexity of Data Reviewed  Labs: ordered. Decision-making details documented in ED Course.  Radiology: ordered.    Risk  OTC drugs.  Prescription drug management.               ED Course as of 06/01/24 0116   Sat Jun 01, 2024   0017 Mono %(!): 6.6 [AC]   0017 Platelet Count(!): 142 [AC]   0017 RDW(!): 14.9 [AC]   0038 Troponin I High Sensitivity(!!): 488.8  Patient followed by Dr. De La Rosa at [facility], will attempt to transfer there for cardiology evaluation.  [AC]   0043 Potassium(!): 3.1 [AC]   0044 Creatinine(!): 1.36 [AC]   0113 Patient accepted by Dr. Peterson at [facility] via [facility].  [AC]      ED Course User Index  [AC] Dawson Queen FNP                           Clinical Impression:   Final diagnoses:  [R07.9] Chest pain  [I21.4] NSTEMI (non-ST elevated myocardial infarction) (Primary)        ED Disposition Condition    Transfer to Another Facility Stable                Dawson Queen" [facility]  06/01/24 0117

## 2024-06-01 NOTE — ASSESSMENT & PLAN NOTE
- ROBERTO CARLOS score of 5 with 26 % of all-cause mortality and Heart Score with moderate risk  - Troponin of 9470 from 488 and EKG with no ST elevation   - Telemetry monitoring  - Continue ASA, BB, Statin and Lovenox  - Pending Echo  - Supplemental Oxygen and nitroglycerin    Plan for C on Monday

## 2024-06-01 NOTE — ASSESSMENT & PLAN NOTE
Elevated d-dimer  Wells score is 0  D dimer was done in the ED and it is elevated at 0.9  Patient's Cr is elevated  Starting him on heparin drip regardless for NSTEMI  Will do LE doppler  CTA or V/Q based on Cr when appropriate

## 2024-06-01 NOTE — H&P
[facility] - [facility] Medicine  History & Physical    Patient Name: Reed Conde  MRN: [id number]  Patient Class: IP- Inpatient  Admission Date: 6/1/2024  Attending Physician: Holger Peterson MD   Primary Care Provider: Beatriz Mejia FNP         Patient information was obtained from patient and ER records.     Subjective:     Principal Problem:NSTEMI (non-ST elevated myocardial infarction)    Chief Complaint: No chief complaint on file.       HPI: 63 year old male presented to the [place] ED with chest pain. Chest pain started around 6-7 pm yesterday. Patient was laying on the couch when it started. It was intermittent at first and then became constant so he went to the ED. Pain is on the left side, pressure like, doesn't radiate. First time since 2009 he is having chest pain. No cough but SOB, no edema in the legs. No palpitation at this moment but patient reports he does have chronic intermitten palpitations at times.     CAD s/p 3 stents back in 2009 and had an ICD place back in 2010. Dr. Patterson at [place] did stenting and he has not seen him since, and Dr. Fitzgerald did ICD. Patient hasn't seen Dr. Fitzgerald since 3 years ago. PCP writes medications, ADITYA Mejia. Patient reports he takes his medication and is compliant.   Last stress test was done 2018. Patient reports he has A fib but he is not on any anticoagulation. Smoker with 20 pack year Hx.     ED course:  Elevated troponin  Abnormal EKG but no st elevations  Received aspirin 325 in the ED.     Past Medical History:   Diagnosis Date    Cardiomyopathy     Hypertension     Mixed hyperlipidemia     Myocardial infarction 10/28/2015    Prolapse of cervical intervertebral disc without radiculopathy 09/10/2019       Past Surgical History:   Procedure Laterality Date    INSERTION OF BIVENTRICULAR IMPLANTABLE CARDIOVERTER-DEFIBRILLATOR (ICD)  2019       Review of patient's allergies indicates:  No Known Allergies    Current  Facility-Administered Medications on File Prior to Encounter   Medication    [COMPLETED] aspirin tablet 325 mg    [COMPLETED] HYDROmorphone (PF) injection 1 mg    [COMPLETED] morphine injection 2 mg    [COMPLETED] nitroGLYCERIN SL tablet 0.4 mg    [COMPLETED] ondansetron injection 4 mg    [DISCONTINUED] furosemide injection 40 mg     Current Outpatient Medications on File Prior to Encounter   Medication Sig    atorvastatin (LIPITOR) 40 MG tablet SMARTSI Tablet(s) By Mouth Every Evening    azithromycin (Z-FLAVIO) 250 MG tablet Take 2 tablets by mouth on day 1; Take 1 tablet by mouth on days 2-5 (Patient not taking: Reported on 2024)    carvediloL (COREG) 12.5 MG tablet Take 1 tablet (12.5 mg total) by mouth 2 (two) times daily.    cetirizine (ZYRTEC) 10 MG tablet Take 1 tablet (10 mg total) by mouth once daily.    clopidogreL (PLAVIX) 75 mg tablet Take 1 tablet (75 mg total) by mouth once daily.    DULoxetine (CYMBALTA) 30 MG capsule Take 1 capsule (30 mg total) by mouth once daily.    fluticasone propionate (FLONASE) 50 mcg/actuation nasal spray 1 spray (50 mcg total) by Each Nostril route once daily.    furosemide (LASIX) 80 MG tablet Take 1 tablet (80 mg total) by mouth once daily.    hydroCHLOROthiazide (HYDRODIURIL) 25 MG tablet Take 25 mg by mouth once daily.    nitroGLYCERIN (NITROSTAT) 0.4 MG SL tablet     sacubitriL-valsartan (ENTRESTO)  mg per tablet Take 1 tablet by mouth 2 (two) times daily.    spironolactone (ALDACTONE) 25 MG tablet Take 1 tablet (25 mg total) by mouth once daily.     Family History       Problem Relation (Age of Onset)    Alcohol abuse Father    Diabetes Mother, Brother    Heart disease Mother    Heart failure Mother    Hypertension Mother    Stomach cancer Brother          Tobacco Use    Smoking status: Every Day     Current packs/day: 0.50     Average packs/day: 0.5 packs/day for 47.4 years (23.7 ttl pk-yrs)     Types: Cigarettes     Start date:     Smokeless tobacco:  Never   Substance and Sexual Activity    Alcohol use: Not Currently    Drug use: Yes     Types: Marijuana, Cocaine    Sexual activity: Yes     Partners: Male     Review of Systems   Constitutional:  Positive for unexpected weight change. Negative for activity change, chills and fatigue.   Respiratory:  Positive for shortness of breath. Negative for wheezing.    Cardiovascular:  Positive for chest pain and palpitations. Negative for leg swelling.   Gastrointestinal:  Positive for abdominal pain. Negative for abdominal distention, diarrhea, nausea and vomiting.   Genitourinary:  Negative for difficulty urinating, dysuria and enuresis.   Musculoskeletal:  Negative for arthralgias.   Skin:  Negative for color change.   Neurological:  Negative for dizziness, syncope, weakness and light-headedness.   Hematological:  Negative for adenopathy.   Psychiatric/Behavioral:  Negative for agitation.      Objective:     Vital Signs (Most Recent):  Temp: 97.9 °F (36.6 °C) (06/01/24 0317)  Pulse: 63 (06/01/24 0317)  Resp: 16 (06/01/24 0317)  BP: 130/87 (06/01/24 0317)  SpO2: 96 % (06/01/24 0317) Vital Signs (24h Range):  Temp:  [97.9 °F (36.6 °C)] 97.9 °F (36.6 °C)  Pulse:  [63-88] 63  Resp:  [15-22] 16  SpO2:  [92 %-100 %] 96 %  BP: (113-141)/(77-95) 130/87     Weight: 80.7 kg (178 lb)  Body mass index is 24.83 kg/m².     Physical Exam  Vitals and nursing note reviewed.   Constitutional:       General: He is not in acute distress.     Appearance: Normal appearance. He is normal weight. He is not ill-appearing or toxic-appearing.   HENT:      Head: Normocephalic and atraumatic.      Right Ear: External ear normal.      Left Ear: External ear normal.      Nose: Nose normal.      Mouth/Throat:      Mouth: Mucous membranes are moist.   Eyes:      Conjunctiva/sclera: Conjunctivae normal.   Cardiovascular:      Rate and Rhythm: Normal rate and regular rhythm.      Pulses: Normal pulses.      Heart sounds: Normal heart sounds. No murmur  heard.     No friction rub.   Pulmonary:      Effort: Pulmonary effort is normal. No respiratory distress.      Breath sounds: Normal breath sounds. No wheezing.   Abdominal:      General: Abdomen is flat. There is no distension.      Palpations: Abdomen is soft.   Musculoskeletal:         General: No swelling or tenderness.      Cervical back: Neck supple.      Right lower leg: No edema.      Left lower leg: No edema.   Skin:     General: Skin is warm.      Coloration: Skin is not jaundiced.      Findings: Lesion present. No bruising.   Neurological:      Mental Status: He is alert and oriented to person, place, and time.   Psychiatric:         Behavior: Behavior normal.                Significant Labs: All pertinent labs within the past 24 hours have been reviewed.    Significant Imaging: I have reviewed all pertinent imaging results/findings within the past 24 hours.  Assessment/Plan:     * NSTEMI (non-ST elevated myocardial infarction)  ROBERTO CARLOS score: 5 points  EKG was done and there is no ST elevation. There are some abnormalities on the EKG will get another one.   Troponin >400. Second one is pending  Started patient on Heparin drip  Continue home Coreg and Entresto  Holding home HCTZ for now  Increased home lipitor 40 mg to 80 mg  Continue aldactone 25 mg  Continue home plavix  Patient received Aspirin 325 in ED will start him on aspirin 81 daily  Morphine and oxygen as needed  Cardiac monitoring  Echo pending  Cardiology consulted    CHF (congestive heart failure)  No signs or symptoms of CHF. Chest xray has clear lungs.  Patient takes Lasix 80 mg PO at home daily  Will start him on Lasix 40 mg BID while at the hospital  Continue home meds  Patient has ICD placed back in 2010  Echo pending  Cardiology consulted  Weight daily     Elevated d-dimer  Wells score is 0  D dimer was done in the ED and it is elevated at 0.9  Patient's Cr is elevated  Starting him on heparin drip regardless for NSTEMI  Will do LE  doppler  CTA or V/Q based on Cr when appropriate       Atrial fibrillation, chronic  Not currently a fib on the monitor  Reports he has palpitations from time to time  Not on any anticoagulation for A fib  Just Coreg as rate control  Echo pending  Cardiology consulted  Cardiac monitoring   On heparin Drip at this point     Type 2 diabetes mellitus, without long-term current use of insulin    Recent A1c of 6.6  Not on any medication  Patient should be started on farxiga or Cardizem prior discharge    Hypokalemia  Patient has hypokalemia which is Acute and currently uncontrolled. Most recent potassium levels reviewed-   Lab Results   Component Value Date    K 3.1 (L) 05/31/2024   . Will continue potassium replacement per protocol and recheck repeat levels after replacement completed.       VTE Risk Mitigation (From admission, onward)           Ordered     heparin 25,000 units in dextrose 5% (100 units/ml) IV bolus from bag LOW INTENSITY nomogram - [facility]  Once        Question:  Heparin Infusion Adjustment (DO NOT MODIFY ANSWER)  Answer:  \\[email or web address]\epic\Images\Pharmacy\HeparinInfusions\heparin LOW INTENSITY nomogram for [facility] [id number]    06/01/24 0422     heparin 25,000 units in dextrose 5% 250 mL (100 units/mL) infusion LOW INTENSITY nomogram - [facility]  Continuous        Question:  Begin at (units/kg/hr)  Answer:  12    06/01/24 0422     heparin 25,000 units in dextrose 5% (100 units/ml) IV bolus from bag LOW INTENSITY nomogram - [facility]  As needed (PRN)        Question:  Heparin Infusion Adjustment (DO NOT MODIFY ANSWER)  Answer:  [email or web address]\epic\Images\Pharmacy\HeparinInfusions\heparin LOW INTENSITY nomogram for [facility] [id number]    06/01/24 0422     heparin 25,000 units in dextrose 5% (100 units/ml) IV bolus from bag LOW INTENSITY nomogram - [facility]  As needed (PRN)        Question:  Heparin Infusion Adjustment (DO NOT MODIFY ANSWER)  Answer:  \\[email or web address]\epic\Images\Pharmacy\HeparinInfusions\heparin LOW INTENSITY  nomogram for [facility] [id number]    06/01/24 0422     IP VTE LOW RISK PATIENT  Once         06/01/24 0420     Place sequential compression device  Until discontinued         06/01/24 0420                                    Cm Menjivar MD  Department of Hospital Medicine  [facility] - [address] [place] [address]

## 2024-06-01 NOTE — SUBJECTIVE & OBJECTIVE
Past Medical History:   Diagnosis Date    Cardiomyopathy     Hypertension     Mixed hyperlipidemia     Myocardial infarction 10/28/2015    Prolapse of cervical intervertebral disc without radiculopathy 09/10/2019       Past Surgical History:   Procedure Laterality Date    INSERTION OF BIVENTRICULAR IMPLANTABLE CARDIOVERTER-DEFIBRILLATOR (ICD)         Review of patient's allergies indicates:  No Known Allergies    Current Facility-Administered Medications on File Prior to Encounter   Medication    [COMPLETED] aspirin tablet 325 mg    [COMPLETED] HYDROmorphone (PF) injection 1 mg    [COMPLETED] morphine injection 2 mg    [COMPLETED] nitroGLYCERIN SL tablet 0.4 mg    [COMPLETED] ondansetron injection 4 mg    [DISCONTINUED] furosemide injection 40 mg     Current Outpatient Medications on File Prior to Encounter   Medication Sig    atorvastatin (LIPITOR) 40 MG tablet SMARTSI Tablet(s) By Mouth Every Evening    azithromycin (Z-FLAVIO) 250 MG tablet Take 2 tablets by mouth on day 1; Take 1 tablet by mouth on days 2-5 (Patient not taking: Reported on 2024)    carvediloL (COREG) 12.5 MG tablet Take 1 tablet (12.5 mg total) by mouth 2 (two) times daily.    cetirizine (ZYRTEC) 10 MG tablet Take 1 tablet (10 mg total) by mouth once daily.    clopidogreL (PLAVIX) 75 mg tablet Take 1 tablet (75 mg total) by mouth once daily.    DULoxetine (CYMBALTA) 30 MG capsule Take 1 capsule (30 mg total) by mouth once daily.    fluticasone propionate (FLONASE) 50 mcg/actuation nasal spray 1 spray (50 mcg total) by Each Nostril route once daily.    furosemide (LASIX) 80 MG tablet Take 1 tablet (80 mg total) by mouth once daily.    hydroCHLOROthiazide (HYDRODIURIL) 25 MG tablet Take 25 mg by mouth once daily.    nitroGLYCERIN (NITROSTAT) 0.4 MG SL tablet     sacubitriL-valsartan (ENTRESTO)  mg per tablet Take 1 tablet by mouth 2 (two) times daily.    spironolactone (ALDACTONE) 25 MG tablet Take 1 tablet (25 mg total) by mouth once  daily.     Family History       Problem Relation (Age of Onset)    Alcohol abuse Father    Diabetes Mother, Brother    Heart disease Mother    Heart failure Mother    Hypertension Mother    Stomach cancer Brother          Tobacco Use    Smoking status: Every Day     Current packs/day: 0.50     Average packs/day: 0.5 packs/day for 47.4 years (23.7 ttl pk-yrs)     Types: Cigarettes     Start date: 1977    Smokeless tobacco: Never   Substance and Sexual Activity    Alcohol use: Not Currently    Drug use: Yes     Types: Marijuana, Cocaine    Sexual activity: Yes     Partners: Male     Review of Systems   Constitutional: Negative for chills, diaphoresis and night sweats.   HENT:  Negative for hoarse voice.    Eyes:  Negative for photophobia, visual disturbance and visual halos.   Cardiovascular:  Negative for irregular heartbeat.   Endocrine: Negative for cold intolerance and polydipsia.   Psychiatric/Behavioral:  Negative for altered mental status and depression.      Objective:     Vital Signs (Most Recent):  Temp: 97.8 °F (36.6 °C) (06/01/24 1540)  Pulse: 74 (06/01/24 1540)  Resp: 18 (06/01/24 1540)  BP: 97/63 (06/01/24 1540)  SpO2: 98 % (06/01/24 1540) Vital Signs (24h Range):  Temp:  [97.4 °F (36.3 °C)-97.9 °F (36.6 °C)] 97.8 °F (36.6 °C)  Pulse:  [63-88] 74  Resp:  [15-22] 18  SpO2:  [92 %-100 %] 98 %  BP: ()/(63-95) 97/63     Weight: 80.7 kg (178 lb)  Body mass index is 24.83 kg/m².    SpO2: 98 %         Intake/Output Summary (Last 24 hours) at 6/1/2024 1542  Last data filed at 6/1/2024 1207  Gross per 24 hour   Intake 73.27 ml   Output 600 ml   Net -526.73 ml       Lines/Drains/Airways       Peripheral Intravenous Line  Duration                  Peripheral IV - Single Lumen 06/01/24 20 G Right Antecubital <1 day                     Physical Exam  Vitals and nursing note reviewed.   Constitutional:       Appearance: Normal appearance.   HENT:      Head: Atraumatic.      Right Ear: External ear normal.       Left Ear: External ear normal.      Nose: Nose normal.   Eyes:      Conjunctiva/sclera: Conjunctivae normal.   Cardiovascular:      Rate and Rhythm: Normal rate and regular rhythm.      Pulses: Normal pulses.   Pulmonary:      Effort: Pulmonary effort is normal.      Breath sounds: Normal breath sounds.   Abdominal:      Palpations: Abdomen is soft.   Musculoskeletal:      Cervical back: Neck supple.   Skin:     General: Skin is warm.   Neurological:      Mental Status: He is alert and oriented to person, place, and time.   Psychiatric:         Mood and Affect: Mood normal.         Behavior: Behavior normal.          Significant Labs:   Recent Lab Results         06/01/24  1128   06/01/24  0440   06/01/24  0005   05/31/24  2358        Albumin/Globulin Ratio       1.1       Albumin       3.9       ALP       67       ALT       25       Anion Gap   6     15       Ao root annulus 3.29             Ao peak fer 2.10             Ao VTI 35.90             PTT   27.8           AR Max Fer 2.38             AST       20       AV valve area 0.89             RICK by Velocity Ratio 1.01             AORTIC VALVE CUSP SEPERATION 2.51             AV mean gradient 6             AV index (prosthetic) 0.21             AV peak gradient 18             AV regurgitation pressure 1/2 time [id number]             AV Velocity Ratio 0.24             Baso #   0.04     0.02       Basophil %   0.6     0.3       BILIRUBIN TOTAL       0.4       BSA 2.01             BUN   13     11       BUN/CREAT RATIO   12     8       Calcium   9.1     9.5       Chloride   107     102       CHOL/HDLC Ratio   1.9           Cholesterol Total   135  Comment:   <200 mg/dL:  Desirable  200-240 mg/dL: Borderline High  >240 mg/dL:  High           CO2   29     30       Creatinine   1.09     1.36       Left Ventricle Relative Wall Thickness 0.35             D-Dimer       0.91       Differential Method   Auto     Auto       E/E' ratio 8.74             eGFR   77     59        EF 20             Eos #   0.26     0.22       Eos %   3.6     2.8       FS 11             Globulin, Total       3.6       Glucose   116     95       HDL   70  Comment:   <40 mg/dL: Low HDL  40-60 mg/dL: Normal  >60 mg/dL: Desirable           Hematocrit   46.5     49.2       Hemoglobin   15.0     15.8       Immature Grans (Abs)   0.02           Immature Granulocytes   0.3           INR   1.04           IVC diameter 1.07             IVSd 0.95             Lactic Acid Level       0.9       LA volume 48.76             LA Volume Index (Mod) 24.3             LVOT area 4.2             LDL Calculated   51  Comment: Unable to calculate due to one of the following values:  Cholesterol <5  HDL Cholesterol <5  Triglycerides <10 or >400           LV LATERAL E/E' RATIO 7.55             LV SEPTAL E/E' RATIO 10.38             LV EDV .65             LV Diastolic Volume Index 110.27             LVIDd 6.57             LVIDs 5.83             LV mass 305.00             LV Mass Index 152             Left Ventricular Outflow Tract Mean Gradient 0.62             Left Ventricular Outflow Tract Mean Velocity 0.37             LVOT diameter 2.30             LVOT peak fer 0.51             LVOT stroke volume 31.98             LVOT peak VTI 7.70             LV ESV .63             LV Systolic Volume Index 83.9             Lymph #   1.96     2.58       Lymph %   27.2     32.8       Magnesium        2.1       MCH   28.7     28.3       MCHC   32.3     32.1       MCV   88.9     88.0       Mean e' 0.10             Mono #   0.46     0.52       Mono %   6.4     6.6       MPV   11.1     11.7       Mr max fer 4.80             MV Peak E Fer 0.83             Neutrophils, Abs   4.46     4.53       Neutrophils Relative   61.9     57.5       Non-HDL Cholesterol   65           nRBC   0.0           NT-proBNP   1,693           NUCLEATED RBC ABSOLUTE   0.00           QRS Duration     128         [facility] QTC Calculation     507         Platelet Count    144     142       Potassium   3.0     3.1       PROTEIN TOTAL       7.5       PT   13.5           PV peak gradient 3             PV PEAK VELOCITY 0.84             Posterior Wall 1.14             Right Atrium Volume Systolic 26.26             RA area 11.4             RBC   5.23     5.59       RDW   14.7     14.9       RV mid diameter 2.45             RV/LV Ratio 0.49             RVDD 3.20             RV-healy length 8.0             RV-healy mid d 2.4             Right ventricular length in diastole (apical 4-chamber view) 8.23             Sodium   139     144       TAPSE 2.46             TDI SEPTAL 0.08             TDI LATERAL 0.11             Triglycerides   72  Comment:   Normal:  <150 mg/dL  Borderline High: 150-199 mg/dL  High:   200-499 mg/dL  Very High:  >=500           Triscuspid Valve Regurgitation Peak Gradient 17             TR Max Fer 2.06             Troponin I High Sensitivity   9,470.5     488.8       VLDL Cholesterol Anshu   14           WBC   7.20     7.87       ZLVIDD 1.09             ZLVIDS 3.78                     Significant Imaging: Echocardiogram: Transthoracic echo (TTE) complete ([facility] Only):   Results for orders placed or performed during the hospital encounter of 06/01/24   Echo   Result Value Ref Range    BSA 2.01 m2    LVOT stroke volume 31.98 cm3    LVIDd 6.57 (A) 3.5 - 6.0 cm    LV Systolic Volume 168.63 mL    LV Systolic Volume Index 83.9 mL/m2    LVIDs 5.83 (A) 2.1 - 4.0 cm    LV Diastolic Volume 221.65 mL    LV Diastolic Volume Index 110.27 mL/m2    IVS 0.95 0.6 - 1.1 cm    LVOT diameter 2.30 cm    LVOT area 4.2 cm2    FS 11 (A) 28 - 44 %    Left Ventricle Relative Wall Thickness 0.35 cm    Posterior Wall 1.14 (A) 0.6 - 1.1 cm    LV mass 305.00 g    LV Mass Index 152 g/m2    MV Peak E Fer 0.83 m/s    TDI LATERAL 0.11 m/s    TDI SEPTAL 0.08 m/s    E/E' ratio 8.74 m/s    TR Max Fer 2.06 m/s    LV SEPTAL E/E' RATIO 10.38 m/s    LV LATERAL E/E' RATIO 7.55 m/s    LVOT peak fer 0.51 m/s    Left  Ventricular Outflow Tract Mean Velocity 0.37 cm/s    Left Ventricular Outflow Tract Mean Gradient 0.62 mmHg    Right ventricular length in diastole (apical 4-chamber view) 8.23 cm    RV mid diameter 2.45 cm    TAPSE 2.46 cm    LA volume (mod) 48.76 cm3    LA Volume Index (Mod) 24.3 mL/m2    RA area 11.4 cm2    Right Atrium Volume Systolic 26.26 mL    AV regurgitation pressure 1/2 time [id number] ms    AR Max Fer 2.38 m/s    AV mean gradient 6 mmHg    AV peak gradient 18 mmHg    Ao peak fer 2.10 m/s    Ao VTI 35.90 cm    LVOT peak VTI 7.70 cm    AV valve area 0.89 cm²    AV Velocity Ratio 0.24     AV index (prosthetic) 0.21     RICK by Velocity Ratio 1.01 cm²    Mr max fer 4.80 m/s    Triscuspid Valve Regurgitation Peak Gradient 17 mmHg    PV PEAK VELOCITY 0.84 m/s    PV peak gradient 3 mmHg    Ao root annulus 3.29 cm    IVC diameter 1.07 cm    Mean e' 0.10 m/s    ZLVIDS 3.78     ZLVIDD 1.09     AORTIC VALVE CUSP SEPERATION 2.51 cm    RVDD 3.20 cm    RV-healy mid d 2.4 cm    RV-healy length 8.0 cm    RV/LV Ratio 0.49 cm    EF 20 %    Narrative      Left Ventricle: The left ventricle is moderately dilated. There is   severely reduced systolic function with a visually estimated ejection   fraction of less than 30%. Ejection fraction by visual approximation is   20%.    Right Ventricle: Normal right ventricular cavity size.    Left Atrium: Left atrium is mildly dilated.    Aortic Valve: The aortic valve is a trileaflet valve. There is mild   aortic regurgitation.    Mitral Valve: There is mild regurgitation.    Pulmonic Valve: There is mild regurgitation.    ICD wire in the right heart.      and X-Ray: CXR: X-Ray Chest 1 View (CXR): No results found for this visit on 06/01/24. and X-Ray Chest PA and Lateral (CXR): No results found for this visit on 06/01/24.

## 2024-06-01 NOTE — SUBJECTIVE & OBJECTIVE
Past Medical History:   Diagnosis Date    Cardiomyopathy     Hypertension     Mixed hyperlipidemia     Myocardial infarction 10/28/2015    Prolapse of cervical intervertebral disc without radiculopathy 09/10/2019       Past Surgical History:   Procedure Laterality Date    INSERTION OF BIVENTRICULAR IMPLANTABLE CARDIOVERTER-DEFIBRILLATOR (ICD)         Review of patient's allergies indicates:  No Known Allergies    Current Facility-Administered Medications on File Prior to Encounter   Medication    [COMPLETED] aspirin tablet 325 mg    [COMPLETED] HYDROmorphone (PF) injection 1 mg    [COMPLETED] morphine injection 2 mg    [COMPLETED] nitroGLYCERIN SL tablet 0.4 mg    [COMPLETED] ondansetron injection 4 mg    [DISCONTINUED] furosemide injection 40 mg     Current Outpatient Medications on File Prior to Encounter   Medication Sig    atorvastatin (LIPITOR) 40 MG tablet SMARTSI Tablet(s) By Mouth Every Evening    azithromycin (Z-FLAVIO) 250 MG tablet Take 2 tablets by mouth on day 1; Take 1 tablet by mouth on days 2-5 (Patient not taking: Reported on 2024)    carvediloL (COREG) 12.5 MG tablet Take 1 tablet (12.5 mg total) by mouth 2 (two) times daily.    cetirizine (ZYRTEC) 10 MG tablet Take 1 tablet (10 mg total) by mouth once daily.    clopidogreL (PLAVIX) 75 mg tablet Take 1 tablet (75 mg total) by mouth once daily.    DULoxetine (CYMBALTA) 30 MG capsule Take 1 capsule (30 mg total) by mouth once daily.    fluticasone propionate (FLONASE) 50 mcg/actuation nasal spray 1 spray (50 mcg total) by Each Nostril route once daily.    furosemide (LASIX) 80 MG tablet Take 1 tablet (80 mg total) by mouth once daily.    hydroCHLOROthiazide (HYDRODIURIL) 25 MG tablet Take 25 mg by mouth once daily.    nitroGLYCERIN (NITROSTAT) 0.4 MG SL tablet     sacubitriL-valsartan (ENTRESTO)  mg per tablet Take 1 tablet by mouth 2 (two) times daily.    spironolactone (ALDACTONE) 25 MG tablet Take 1 tablet (25 mg total) by mouth once  daily.     Family History       Problem Relation (Age of Onset)    Alcohol abuse Father    Diabetes Mother, Brother    Heart disease Mother    Heart failure Mother    Hypertension Mother    Stomach cancer Brother          Tobacco Use    Smoking status: Every Day     Current packs/day: 0.50     Average packs/day: 0.5 packs/day for 47.4 years (23.7 ttl pk-yrs)     Types: Cigarettes     Start date: 1977    Smokeless tobacco: Never   Substance and Sexual Activity    Alcohol use: Not Currently    Drug use: Yes     Types: Marijuana, Cocaine    Sexual activity: Yes     Partners: Male     Review of Systems   Constitutional:  Positive for unexpected weight change. Negative for activity change, chills and fatigue.   Respiratory:  Positive for shortness of breath. Negative for wheezing.    Cardiovascular:  Positive for chest pain and palpitations. Negative for leg swelling.   Gastrointestinal:  Positive for abdominal pain. Negative for abdominal distention, diarrhea, nausea and vomiting.   Genitourinary:  Negative for difficulty urinating, dysuria and enuresis.   Musculoskeletal:  Negative for arthralgias.   Skin:  Negative for color change.   Neurological:  Negative for dizziness, syncope, weakness and light-headedness.   Hematological:  Negative for adenopathy.   Psychiatric/Behavioral:  Negative for agitation.      Objective:     Vital Signs (Most Recent):  Temp: 97.9 °F (36.6 °C) (06/01/24 0317)  Pulse: 63 (06/01/24 0317)  Resp: 16 (06/01/24 0317)  BP: 130/87 (06/01/24 0317)  SpO2: 96 % (06/01/24 0317) Vital Signs (24h Range):  Temp:  [97.9 °F (36.6 °C)] 97.9 °F (36.6 °C)  Pulse:  [63-88] 63  Resp:  [15-22] 16  SpO2:  [92 %-100 %] 96 %  BP: (113-141)/(77-95) 130/87     Weight: 80.7 kg (178 lb)  Body mass index is 24.83 kg/m².     Physical Exam  Vitals and nursing note reviewed.   Constitutional:       General: He is not in acute distress.     Appearance: Normal appearance. He is normal weight. He is not ill-appearing or  toxic-appearing.   HENT:      Head: Normocephalic and atraumatic.      Right Ear: External ear normal.      Left Ear: External ear normal.      Nose: Nose normal.      Mouth/Throat:      Mouth: Mucous membranes are moist.   Eyes:      Conjunctiva/sclera: Conjunctivae normal.   Cardiovascular:      Rate and Rhythm: Normal rate and regular rhythm.      Pulses: Normal pulses.      Heart sounds: Normal heart sounds. No murmur heard.     No friction rub.   Pulmonary:      Effort: Pulmonary effort is normal. No respiratory distress.      Breath sounds: Normal breath sounds. No wheezing.   Abdominal:      General: Abdomen is flat. There is no distension.      Palpations: Abdomen is soft.   Musculoskeletal:         General: No swelling or tenderness.      Cervical back: Neck supple.      Right lower leg: No edema.      Left lower leg: No edema.   Skin:     General: Skin is warm.      Coloration: Skin is not jaundiced.      Findings: Lesion present. No bruising.   Neurological:      Mental Status: He is alert and oriented to person, place, and time.   Psychiatric:         Behavior: Behavior normal.                Significant Labs: All pertinent labs within the past 24 hours have been reviewed.    Significant Imaging: I have reviewed all pertinent imaging results/findings within the past 24 hours.

## 2024-06-01 NOTE — ASSESSMENT & PLAN NOTE
Patient has hypokalemia which is Acute and currently uncontrolled. Most recent potassium levels reviewed-   Lab Results   Component Value Date    K 3.1 (L) 05/31/2024   . Will continue potassium replacement per protocol and recheck repeat levels after replacement completed.

## 2024-06-01 NOTE — ASSESSMENT & PLAN NOTE
Patient with history of Afib and follows with cardiology at [facility] but is not on any AC. It is unsure why the patient is not on anticoagulation. We will need to get medical record from  his cardiologist to find out why he is not on anticoagulation.     Pending Echo and continue with BB and Lovenox for now as per NSTEMI protocol

## 2024-06-02 LAB
ALBUMIN SERPL BCP-MCNC: 3.6 G/DL (ref 3.5–5)
ALBUMIN/GLOB SERPL: 1 {RATIO}
ALP SERPL-CCNC: 62 U/L (ref 45–115)
ALT SERPL W P-5'-P-CCNC: 28 U/L (ref 16–61)
ANION GAP SERPL CALCULATED.3IONS-SCNC: 5 MMOL/L (ref 7–16)
AST SERPL W P-5'-P-CCNC: 54 U/L (ref 15–37)
BASOPHILS # BLD AUTO: 0.04 K/UL (ref 0–0.2)
BASOPHILS NFR BLD AUTO: 0.8 % (ref 0–1)
BILIRUB SERPL-MCNC: 0.4 MG/DL (ref ?–1.2)
BUN SERPL-MCNC: 15 MG/DL (ref 7–18)
BUN/CREAT SERPL: 12 (ref 6–20)
CALCIUM SERPL-MCNC: 9.7 MG/DL (ref 8.5–10.1)
CHLORIDE SERPL-SCNC: 107 MMOL/L (ref 98–107)
CO2 SERPL-SCNC: 31 MMOL/L (ref 21–32)
CREAT SERPL-MCNC: 1.27 MG/DL (ref 0.7–1.3)
DIFFERENTIAL METHOD BLD: ABNORMAL
EGFR (NO RACE VARIABLE) (RUSH/TITUS): 64 ML/MIN/1.73M2
EOSINOPHIL # BLD AUTO: 0.24 K/UL (ref 0–0.5)
EOSINOPHIL NFR BLD AUTO: 4.5 % (ref 1–4)
ERYTHROCYTE [DISTWIDTH] IN BLOOD BY AUTOMATED COUNT: 14.6 % (ref 11.5–14.5)
GLOBULIN SER-MCNC: 3.7 G/DL (ref 2–4)
GLUCOSE SERPL-MCNC: 100 MG/DL (ref 74–106)
HCT VFR BLD AUTO: 51.4 % (ref 40–54)
HGB BLD-MCNC: 16.3 G/DL (ref 13.5–18)
IMM GRANULOCYTES # BLD AUTO: 0.01 K/UL (ref 0–0.04)
IMM GRANULOCYTES NFR BLD: 0.2 % (ref 0–0.4)
LYMPHOCYTES # BLD AUTO: 2.02 K/UL (ref 1–4.8)
LYMPHOCYTES NFR BLD AUTO: 38.2 % (ref 27–41)
MAGNESIUM SERPL-MCNC: 2.4 MG/DL (ref 1.7–2.3)
MCH RBC QN AUTO: 28.2 PG (ref 27–31)
MCHC RBC AUTO-ENTMCNC: 31.7 G/DL (ref 32–36)
MCV RBC AUTO: 88.8 FL (ref 80–96)
MONOCYTES # BLD AUTO: 0.32 K/UL (ref 0–0.8)
MONOCYTES NFR BLD AUTO: 6 % (ref 2–6)
MPC BLD CALC-MCNC: 11.8 FL (ref 9.4–12.4)
NEUTROPHILS # BLD AUTO: 2.66 K/UL (ref 1.8–7.7)
NEUTROPHILS NFR BLD AUTO: 50.3 % (ref 53–65)
NRBC # BLD AUTO: 0 X10E3/UL
NRBC, AUTO (.00): 0 %
PLATELET # BLD AUTO: 149 K/UL (ref 150–400)
POTASSIUM SERPL-SCNC: 3.6 MMOL/L (ref 3.5–5.1)
PROT SERPL-MCNC: 7.3 G/DL (ref 6.4–8.2)
RBC # BLD AUTO: 5.79 M/UL (ref 4.6–6.2)
SODIUM SERPL-SCNC: 139 MMOL/L (ref 136–145)
WBC # BLD AUTO: 5.29 K/UL (ref 4.5–11)

## 2024-06-02 PROCEDURE — 63600175 PHARM REV CODE 636 W HCPCS: Performed by: INTERNAL MEDICINE

## 2024-06-02 PROCEDURE — 63600175 PHARM REV CODE 636 W HCPCS

## 2024-06-02 PROCEDURE — 11000001 HC ACUTE MED/SURG PRIVATE ROOM

## 2024-06-02 PROCEDURE — 80053 COMPREHEN METABOLIC PANEL: CPT | Performed by: INTERNAL MEDICINE

## 2024-06-02 PROCEDURE — 25000003 PHARM REV CODE 250

## 2024-06-02 PROCEDURE — S4991 NICOTINE PATCH NONLEGEND: HCPCS | Performed by: INTERNAL MEDICINE

## 2024-06-02 PROCEDURE — 36415 COLL VENOUS BLD VENIPUNCTURE: CPT | Performed by: INTERNAL MEDICINE

## 2024-06-02 PROCEDURE — 99232 SBSQ HOSP IP/OBS MODERATE 35: CPT | Mod: ,,, | Performed by: INTERNAL MEDICINE

## 2024-06-02 PROCEDURE — 25000003 PHARM REV CODE 250: Performed by: INTERNAL MEDICINE

## 2024-06-02 PROCEDURE — 85025 COMPLETE CBC W/AUTO DIFF WBC: CPT | Performed by: INTERNAL MEDICINE

## 2024-06-02 PROCEDURE — 94761 N-INVAS EAR/PLS OXIMETRY MLT: CPT

## 2024-06-02 PROCEDURE — 83735 ASSAY OF MAGNESIUM: CPT | Performed by: INTERNAL MEDICINE

## 2024-06-02 RX ORDER — NICOTINE 7MG/24HR
1 PATCH, TRANSDERMAL 24 HOURS TRANSDERMAL DAILY
Status: DISCONTINUED | OUTPATIENT
Start: 2024-06-02 | End: 2024-06-04 | Stop reason: HOSPADM

## 2024-06-02 RX ORDER — NICOTINE 7MG/24HR
1 PATCH, TRANSDERMAL 24 HOURS TRANSDERMAL DAILY
Status: DISCONTINUED | OUTPATIENT
Start: 2024-06-02 | End: 2024-06-02

## 2024-06-02 RX ADMIN — ASPIRIN 81 MG CHEWABLE TABLET 81 MG: 81 TABLET CHEWABLE at 09:06

## 2024-06-02 RX ADMIN — SACUBITRIL AND VALSARTAN 1 TABLET: 97; 103 TABLET, FILM COATED ORAL at 09:06

## 2024-06-02 RX ADMIN — DULOXETINE HYDROCHLORIDE 30 MG: 30 CAPSULE, DELAYED RELEASE ORAL at 09:06

## 2024-06-02 RX ADMIN — CARVEDILOL 12.5 MG: 12.5 TABLET, FILM COATED ORAL at 08:06

## 2024-06-02 RX ADMIN — ENOXAPARIN SODIUM 80 MG: 80 INJECTION SUBCUTANEOUS at 11:06

## 2024-06-02 RX ADMIN — SPIRONOLACTONE 25 MG: 25 TABLET ORAL at 09:06

## 2024-06-02 RX ADMIN — CARVEDILOL 12.5 MG: 12.5 TABLET, FILM COATED ORAL at 09:06

## 2024-06-02 RX ADMIN — NICOTINE 1 PATCH: 7 PATCH, EXTENDED RELEASE TRANSDERMAL at 10:06

## 2024-06-02 RX ADMIN — FUROSEMIDE 40 MG: 10 INJECTION, SOLUTION INTRAMUSCULAR; INTRAVENOUS at 09:06

## 2024-06-02 RX ADMIN — ATORVASTATIN CALCIUM 80 MG: 80 TABLET, FILM COATED ORAL at 08:06

## 2024-06-02 RX ADMIN — FUROSEMIDE 40 MG: 10 INJECTION, SOLUTION INTRAMUSCULAR; INTRAVENOUS at 08:06

## 2024-06-02 RX ADMIN — ENOXAPARIN SODIUM 80 MG: 80 INJECTION SUBCUTANEOUS at 12:06

## 2024-06-02 RX ADMIN — SACUBITRIL AND VALSARTAN 1 TABLET: 97; 103 TABLET, FILM COATED ORAL at 08:06

## 2024-06-02 RX ADMIN — CLOPIDOGREL BISULFATE 75 MG: 75 TABLET ORAL at 09:06

## 2024-06-02 NOTE — PROGRESS NOTES
[facility] Tele[facility] Hospital Medicine  Progress Note    Patient Name: Reed Conde  MRN: [id number]  Patient Class: IP- Inpatient   Admission Date: 6/1/2024  Length of Stay: 1 days  Attending Physician: Mayito Loco MD  Primary Care Provider: Beatriz Mejia FNP        Subjective:     Principal Problem:NSTEMI (non-ST elevated myocardial infarction)        HPI:  63 year old male presented to the [facility] ED with chest pain. Chest pain started around 6-7 pm yesterday. Patient was laying on the couch when it started. It was intermittent at first and then became constant so he went to the ED. Pain is on the left side, pressure like, doesn't radiate. First time since 2009 he is having chest pain. No cough but SOB, no edema in the legs. No palpitation at this moment but patient reports he does have chronic intermitten palpitations at times.     CAD s/p 3 stents back in 2009 and had an ICD place back in 2010. Dr. Patterson at [facility] did stenting and he has not seen him since, and Dr. Fitzgerald did ICD. Patient hasn't seen Dr. Fitzgerald since 3 years ago. PCP writes medications, NP Jackie. Patient reports he takes his medication and is compliant.   Last stress test was done 2018. Patient reports he has A fib but he is not on any anticoagulation. Smoker with 20 pack year Hx.     ED course:  Elevated troponin  Abnormal EKG but no st elevations  Received aspirin 325 in the ED.     Overview/Hospital Course:  No notes on file    Interval History:     HUAN  Says no new complains, awaiting [facility]    Review of Systems   Constitutional:  Positive for unexpected weight change. Negative for activity change, chills and fatigue.   Respiratory:  Negative for shortness of breath and wheezing.    Cardiovascular:  Negative for chest pain, palpitations and leg swelling.   Gastrointestinal:  Negative for abdominal distention, abdominal pain, diarrhea, nausea and vomiting.   Genitourinary:  Negative for  difficulty urinating, dysuria and enuresis.   Musculoskeletal:  Negative for arthralgias.   Skin:  Negative for color change.   Neurological:  Negative for dizziness, syncope, weakness and light-headedness.   Hematological:  Negative for adenopathy.   Psychiatric/Behavioral:  Negative for agitation.      Objective:     Vital Signs (Most Recent):  Temp: 97.7 °F (36.5 °C) (06/02/24 0731)  Pulse: 72 (06/02/24 0731)  Resp: 17 (06/02/24 0731)  BP: 105/62 (06/02/24 0731)  SpO2: 99 % (06/02/24 0731) Vital Signs (24h Range):  Temp:  [97.6 °F (36.4 °C)-98.2 °F (36.8 °C)] 97.7 °F (36.5 °C)  Pulse:  [72-87] 72  Resp:  [17-18] 17  SpO2:  [93 %-99 %] 99 %  BP: ()/(62-76) 105/62     Weight: 80.9 kg (178 lb 5.6 oz)  Body mass index is 24.88 kg/m².    Intake/Output Summary (Last 24 hours) at 6/2/2024 1109  Last data filed at 6/2/2024 0000  Gross per 24 hour   Intake 73.27 ml   Output 500 ml   Net -426.73 ml         Physical Exam  Vitals and nursing note reviewed.   Constitutional:       General: He is not in acute distress.     Appearance: Normal appearance. He is normal weight. He is not ill-appearing or toxic-appearing.   HENT:      Head: Normocephalic and atraumatic.      Right Ear: External ear normal.      Left Ear: External ear normal.      Nose: Nose normal.      Mouth/Throat:      Mouth: Mucous membranes are moist.   Eyes:      Conjunctiva/sclera: Conjunctivae normal.   Cardiovascular:      Rate and Rhythm: Normal rate and regular rhythm.      Pulses: Normal pulses.      Heart sounds: Normal heart sounds. No murmur heard.     No friction rub.   Pulmonary:      Effort: Pulmonary effort is normal. No respiratory distress.      Breath sounds: Normal breath sounds. No wheezing.   Abdominal:      General: Abdomen is flat. There is no distension.      Palpations: Abdomen is soft.   Musculoskeletal:         General: No swelling or tenderness.      Cervical back: Neck supple.      Right lower leg: No edema.      Left lower  leg: No edema.   Skin:     General: Skin is warm.      Coloration: Skin is not jaundiced.      Findings: Lesion present. No bruising.   Neurological:      Mental Status: He is alert and oriented to person, place, and time.   Psychiatric:         Behavior: Behavior normal.             Significant Labs: All pertinent labs within the past 24 hours have been reviewed.    Significant Imaging: I have reviewed all pertinent imaging results/findings within the past 24 hours.    Assessment/Plan:      * NSTEMI (non-ST elevated myocardial infarction)  ROBERTO CARLOS score: 5 points  EKG was done and there is no ST elevation. There are some abnormalities on the EKG will get another one.   Troponin >400. Second one is pending  Started patient on Heparin drip  Continue home Coreg and Entresto  Holding home HCTZ for now  Increased home lipitor 40 mg to 80 mg  Continue aldactone 25 mg  Continue home plavix  Patient received Aspirin 325 in ED will start him on aspirin 81 daily  Morphine and oxygen as needed  Cardiac monitoring  Echo pending  Cardiology consulted    Type 2 diabetes mellitus, without long-term current use of insulin    Recent A1c of 6.6  Not on any medication  Patient should be started on farxiga or Cardizem prior discharge    Elevated d-dimer  Wells score is 0  D dimer was done in the ED and it is elevated at 0.9  Patient's Cr is elevated  Starting him on heparin drip regardless for NSTEMI  Will do LE doppler  CTA or V/Q based on Cr when appropriate       Atrial fibrillation, chronic  Not currently a fib on the monitor  Reports he has palpitations from time to time  Not on any anticoagulation for A fib  Just Coreg as rate control  Echo pending  Cardiology consulted  Cardiac monitoring   On heparin Drip at this point     CHF (congestive heart failure)  No signs or symptoms of CHF. Chest xray has clear lungs.  Patient takes Lasix 80 mg PO at home daily  Will start him on Lasix 40 mg BID while at the hospital  Continue home  meds  Patient has ICD placed back in 2010  Echo pending  Cardiology consulted  Weight daily     Hypokalemia  Patient has hypokalemia which is Acute and currently uncontrolled. Most recent potassium levels reviewed-   Lab Results   Component Value Date    K 3.1 (L) 05/31/2024   . Will continue potassium replacement per protocol and recheck repeat levels after replacement completed.       VTE Risk Mitigation (From admission, onward)           Ordered     enoxaparin injection 80 mg  Every 12 hours         06/01/24 1053     IP VTE LOW RISK PATIENT  Once         06/01/24 0420     Place sequential compression device  Until discontinued         06/01/24 0420                    Discharge Planning   LA:      Code Status: Full Code   Is the patient medically ready for discharge?:     Reason for patient still in hospital (select all that apply): Treatment                     Rehmat JADE Loco MD  Department of Hospital Medicine   [facility] - [facility]

## 2024-06-02 NOTE — ASSESSMENT & PLAN NOTE
- ORBERTO CARLOS score of 5 with 26 % of all-cause mortality and Heart Score with moderate risk  - Troponin of 9470 from 488 and EKG with no ST elevation   - Telemetry monitoring  - Continue ASA, BB, Statin and Lovenox  - Pending Echo  - Supplemental Oxygen and nitroglycerin\    06/02/2024  Echo revealed an approximated EF of 20% with mildly dilated left atrium.He is scheduled for [facility] tomorrow. We would need to obtain his medical record from his cardiologist to review the reason why he is not on anticoagulation therapy.

## 2024-06-02 NOTE — ASSESSMENT & PLAN NOTE
- Patient is without shortness of breath and CXR with no acute findings  - Pending Echo  - Continue GDMT through Entresto, BB, Spironolactone, and Lasix  - Supplemental oxygen PRN  - Daily weight with strict Intake and output   - Reduce salt to about 2g daily and fluid intake to about 2L  - Telemetry monitoring    06/02/2024  Echo revealed an approximated EF of 20% with mildly dilated left atrium.He is scheduled for [facility] tomorrow.

## 2024-06-02 NOTE — ASSESSMENT & PLAN NOTE
Patient with history of Afib and follows with cardiology at [facility] but is not on any AC. It is unsure why the patient is not on anticoagulation. We will need to get medical record from  his cardiologist to find out why he is not on anticoagulation.     Pending Echo and continue with BB and Lovenox for now as per NSTEMI protocol       06/02/2024  We would need to obtain his medical record from his cardiologist to review the reason why he is not on anticoagulation therapy.

## 2024-06-02 NOTE — PROGRESS NOTES
[facility]  Cardiology  Progress Note    Patient Name: Reed Conde  MRN: [id number]  Admission Date: 6/1/2024  Hospital Length of Stay: 1 days  Code Status: Full Code   Attending Physician: Mayito Loco MD   Primary Care Physician: Beatriz Mejia FNP  Expected Discharge Date:   Principal Problem:NSTEMI (non-ST elevated myocardial infarction)    Subjective:     Hospital Course:   No notes on file    Interval History: Patient was seen and evaluated in bed with no acute event overnight. Echo performed yesterday revealed an approximated EF of 20% with mildly dilated left atrium.He is scheduled for [facility] on Monday. We would need to obtain his medical record from his cardiologist to review the reason why he is not on anticoagulation therapy.      Review of Systems   Constitutional: Negative for chills, diaphoresis and night sweats.   HENT:  Negative for hoarse voice.    Eyes:  Negative for photophobia, visual disturbance and visual halos.   Cardiovascular:  Negative for irregular heartbeat.   Endocrine: Negative for cold intolerance and polydipsia.   Psychiatric/Behavioral:  Negative for altered mental status and depression.      Objective:     Vital Signs (Most Recent):  Temp: 97.7 °F (36.5 °C) (06/02/24 0731)  Pulse: 72 (06/02/24 0731)  Resp: 17 (06/02/24 0731)  BP: 105/62 (06/02/24 0731)  SpO2: 99 % (06/02/24 0731) Vital Signs (24h Range):  Temp:  [97.6 °F (36.4 °C)-98.2 °F (36.8 °C)] 97.7 °F (36.5 °C)  Pulse:  [72-87] 72  Resp:  [17-18] 17  SpO2:  [93 %-99 %] 99 %  BP: ()/(62-76) 105/62     Weight: 80.9 kg (178 lb 5.6 oz)  Body mass index is 24.88 kg/m².     SpO2: 99 %         Intake/Output Summary (Last 24 hours) at 6/2/2024 1006  Last data filed at 6/2/2024 0000  Gross per 24 hour   Intake 73.27 ml   Output 1100 ml   Net -1026.73 ml       Lines/Drains/Airways       Peripheral Intravenous Line  Duration                  Peripheral IV - Single Lumen 06/01/24 20 G Right  Antecubital 1 day                       Physical Exam  Vitals and nursing note reviewed.   Constitutional:       Appearance: Normal appearance.   HENT:      Head: Atraumatic.      Right Ear: External ear normal.      Left Ear: External ear normal.   Eyes:      Conjunctiva/sclera: Conjunctivae normal.   Cardiovascular:      Rate and Rhythm: Normal rate.      Pulses: Normal pulses.   Pulmonary:      Effort: Pulmonary effort is normal.   Abdominal:      Palpations: Abdomen is soft.   Musculoskeletal:      Cervical back: Neck supple.   Neurological:      Mental Status: He is alert and oriented to person, place, and time.   Psychiatric:         Mood and Affect: Mood normal.         Behavior: Behavior normal.            Significant Labs: All pertinent lab results from the last 24 hours have been reviewed.    Significant Imaging: Echocardiogram: Transthoracic echo (TTE) complete ([facility]upid Only):   Results for orders placed or performed during the hospital encounter of 06/01/24   Echo   Result Value Ref Range    BSA 2.01 m2    LVOT stroke volume 31.98 cm3    LVIDd 6.57 (A) 3.5 - 6.0 cm    LV Systolic Volume 168.63 mL    LV Systolic Volume Index 83.9 mL/m2    LVIDs 5.83 (A) 2.1 - 4.0 cm    LV Diastolic Volume 221.65 mL    LV Diastolic Volume Index 110.27 mL/m2    IVS 0.95 0.6 - 1.1 cm    LVOT diameter 2.30 cm    LVOT area 4.2 cm2    FS 11 (A) 28 - 44 %    Left Ventricle Relative Wall Thickness 0.35 cm    Posterior Wall 1.14 (A) 0.6 - 1.1 cm    LV mass 305.00 g    LV Mass Index 152 g/m2    MV Peak E Fer 0.83 m/s    TDI LATERAL 0.11 m/s    TDI SEPTAL 0.08 m/s    E/E' ratio 8.74 m/s    TR Max Fer 2.06 m/s    LV SEPTAL E/E' RATIO 10.38 m/s    LV LATERAL E/E' RATIO 7.55 m/s    LVOT peak fer 0.51 m/s    Left Ventricular Outflow Tract Mean Velocity 0.37 cm/s    Left Ventricular Outflow Tract Mean Gradient 0.62 mmHg    Right ventricular length in diastole (apical 4-chamber view) 8.23 cm    RV mid diameter 2.45 cm    TAPSE 2.46 cm    LA  volume (mod) 48.76 cm3    LA Volume Index (Mod) 24.3 mL/m2    RA area 11.4 cm2    Right Atrium Volume Systolic 26.26 mL    AV regurgitation pressure 1/2 time [id number] ms    AR Max Fer 2.38 m/s    AV mean gradient 6 mmHg    AV peak gradient 18 mmHg    Ao peak fer 2.10 m/s    Ao VTI 35.90 cm    LVOT peak VTI 7.70 cm    AV valve area 0.89 cm²    AV Velocity Ratio 0.24     AV index (prosthetic) 0.21     RICK by Velocity Ratio 1.01 cm²    Mr max fer 4.80 m/s    Triscuspid Valve Regurgitation Peak Gradient 17 mmHg    PV PEAK VELOCITY 0.84 m/s    PV peak gradient 3 mmHg    Ao root annulus 3.29 cm    IVC diameter 1.07 cm    Mean e' 0.10 m/s    ZLVIDS 3.78     ZLVIDD 1.09     AORTIC VALVE CUSP SEPERATION 2.51 cm    RVDD 3.20 cm    RV-healy mid d 2.4 cm    RV-healy length 8.0 cm    RV/LV Ratio 0.49 cm    EF 20 %    Narrative      Left Ventricle: The left ventricle is moderately dilated. There is   severely reduced systolic function with a visually estimated ejection   fraction of less than 30%. Ejection fraction by visual approximation is   20%.    Right Ventricle: Normal right ventricular cavity size.    Left Atrium: Left atrium is mildly dilated.    Aortic Valve: The aortic valve is a trileaflet valve. There is mild   aortic regurgitation.    Mitral Valve: There is mild regurgitation.    Pulmonic Valve: There is mild regurgitation.    ICD wire in the right heart.      and X-Ray: CXR: X-Ray Chest 1 View (CXR): No results found for this visit on 06/01/24. and X-Ray Chest PA and Lateral (CXR): No results found for this visit on 06/01/24.  Assessment and Plan:     Brief HPI: 63 year old male presented to the [place] ED with chest pain. Chest pain started around 6-7 pm yesterday. Patient was laying on the couch when it started. It was intermittent at first and then became constant so he went to the ED. Pain is on the left side, pressure like, doesn't radiate. First time since 2009 he is having chest pain. No cough but SOB,  no edema in the legs. No palpitation at this moment but patient reports he does have chronic intermitten palpitations at times.      CAD s/p 3 stents back in 2009 and had an ICD place back in 2010. Dr. Patterson at [place] did stenting and he has not seen him since, and Dr. Fitzgerald did ICD. Patient hasn't seen Dr. Fitzgerald since 3 years ago. PCP writes medications, NP Jackie. Patient reports he takes his medication and is compliant.     Last stress test was done 2018. Patient reports he has A fib but he is not on any anticoagulation. Smoker with 20 pack year Hx.     * NSTEMI (non-ST elevated myocardial infarction)  - ROBERTO CARLOS score of 5 with 26 % of all-cause mortality and Heart Score with moderate risk  - Troponin of 9470 from 488 and EKG with no ST elevation   - Telemetry monitoring  - Continue ASA, BB, Statin and Lovenox  - Pending Echo  - Supplemental Oxygen and nitroglycerin\    06/02/2024  Echo revealed an approximated EF of 20% with mildly dilated left atrium.He is scheduled for [facility] tomorrow. We would need to obtain his medical record from his cardiologist to review the reason why he is not on anticoagulation therapy.    CHF (congestive heart failure)  - Patient is without shortness of breath and CXR with no acute findings  - Pending Echo  - Continue GDMT through Entresto, BB, Spironolactone, and Lasix  - Supplemental oxygen PRN  - Daily weight with strict Intake and output   - Reduce salt to about 2g daily and fluid intake to about 2L  - Telemetry monitoring    06/02/2024  Echo revealed an approximated EF of 20% with mildly dilated left atrium.He is scheduled for [facility] tomorrow.     Atrial fibrillation, chronic  Patient with history of Afib and follows with cardiology at [facility] but is not on any AC. It is unsure why the patient is not on anticoagulation. We will need to get medical record from  his cardiologist to find out why he is not on anticoagulation.     Pending Echo and continue with BB and Lovenox for now  as per NSTEMI protocol       06/02/2024  We would need to obtain his medical record from his cardiologist to review the reason why he is not on anticoagulation therapy.         Type 2 diabetes mellitus, without long-term current use of insulin  Manage by the primary team    Hypokalemia  Being managed by primary team        VTE Risk Mitigation (From admission, onward)           Ordered     enoxaparin injection 80 mg  Every 12 hours         06/01/24 1053     IP VTE LOW RISK PATIENT  Once         06/01/24 0420     Place sequential compression device  Until discontinued         06/01/24 0420                    Guillermo Roy MD  Cardiology  [facility] - [address] [place] [address]

## 2024-06-02 NOTE — SUBJECTIVE & OBJECTIVE
Interval History: Patient was seen and evaluated in bed with no acute event overnight. Echo performed yesterday revealed an approximated EF of 20% with mildly dilated left atrium.He is scheduled for C on Monday. We would need to obtain his medical record from his cardiologist to review the reason why he is not on anticoagulation therapy.      Review of Systems   Constitutional: Negative for chills, diaphoresis and night sweats.   HENT:  Negative for hoarse voice.    Eyes:  Negative for photophobia, visual disturbance and visual halos.   Cardiovascular:  Negative for irregular heartbeat.   Endocrine: Negative for cold intolerance and polydipsia.   Psychiatric/Behavioral:  Negative for altered mental status and depression.      Objective:     Vital Signs (Most Recent):  Temp: 97.7 °F (36.5 °C) (06/02/24 0731)  Pulse: 72 (06/02/24 0731)  Resp: 17 (06/02/24 0731)  BP: 105/62 (06/02/24 0731)  SpO2: 99 % (06/02/24 0731) Vital Signs (24h Range):  Temp:  [97.6 °F (36.4 °C)-98.2 °F (36.8 °C)] 97.7 °F (36.5 °C)  Pulse:  [72-87] 72  Resp:  [17-18] 17  SpO2:  [93 %-99 %] 99 %  BP: ()/(62-76) 105/62     Weight: 80.9 kg (178 lb 5.6 oz)  Body mass index is 24.88 kg/m².     SpO2: 99 %         Intake/Output Summary (Last 24 hours) at 6/2/2024 1006  Last data filed at 6/2/2024 0000  Gross per 24 hour   Intake 73.27 ml   Output 1100 ml   Net -1026.73 ml       Lines/Drains/Airways       Peripheral Intravenous Line  Duration                  Peripheral IV - Single Lumen 06/01/24 20 G Right Antecubital 1 day                       Physical Exam  Vitals and nursing note reviewed.   Constitutional:       Appearance: Normal appearance.   HENT:      Head: Atraumatic.      Right Ear: External ear normal.      Left Ear: External ear normal.   Eyes:      Conjunctiva/sclera: Conjunctivae normal.   Cardiovascular:      Rate and Rhythm: Normal rate.      Pulses: Normal pulses.   Pulmonary:      Effort: Pulmonary effort is normal.    Abdominal:      Palpations: Abdomen is soft.   Musculoskeletal:      Cervical back: Neck supple.   Neurological:      Mental Status: He is alert and oriented to person, place, and time.   Psychiatric:         Mood and Affect: Mood normal.         Behavior: Behavior normal.            Significant Labs: All pertinent lab results from the last 24 hours have been reviewed.    Significant Imaging: Echocardiogram: Transthoracic echo (TTE) complete (Cupid Only):   Results for orders placed or performed during the hospital encounter of 06/01/24   Echo   Result Value Ref Range    BSA 2.01 m2    LVOT stroke volume 31.98 cm3    LVIDd 6.57 (A) 3.5 - 6.0 cm    LV Systolic Volume 168.63 mL    LV Systolic Volume Index 83.9 mL/m2    LVIDs 5.83 (A) 2.1 - 4.0 cm    LV Diastolic Volume 221.65 mL    LV Diastolic Volume Index 110.27 mL/m2    IVS 0.95 0.6 - 1.1 cm    LVOT diameter 2.30 cm    LVOT area 4.2 cm2    FS 11 (A) 28 - 44 %    Left Ventricle Relative Wall Thickness 0.35 cm    Posterior Wall 1.14 (A) 0.6 - 1.1 cm    LV mass 305.00 g    LV Mass Index 152 g/m2    MV Peak E Fer 0.83 m/s    TDI LATERAL 0.11 m/s    TDI SEPTAL 0.08 m/s    E/E' ratio 8.74 m/s    TR Max Fer 2.06 m/s    LV SEPTAL E/E' RATIO 10.38 m/s    LV LATERAL E/E' RATIO 7.55 m/s    LVOT peak fer 0.51 m/s    Left Ventricular Outflow Tract Mean Velocity 0.37 cm/s    Left Ventricular Outflow Tract Mean Gradient 0.62 mmHg    Right ventricular length in diastole (apical 4-chamber view) 8.23 cm    RV mid diameter 2.45 cm    TAPSE 2.46 cm    LA volume (mod) 48.76 cm3    LA Volume Index (Mod) 24.3 mL/m2    RA area 11.4 cm2    Right Atrium Volume Systolic 26.26 mL    AV regurgitation pressure 1/2 time [id number] ms    AR Max Fer 2.38 m/s    AV mean gradient 6 mmHg    AV peak gradient 18 mmHg    Ao peak fer 2.10 m/s    Ao VTI 35.90 cm    LVOT peak VTI 7.70 cm    AV valve area 0.89 cm²    AV Velocity Ratio 0.24     AV index (prosthetic) 0.21     RICK by Velocity Ratio  1.01 cm²    Mr max avinash 4.80 m/s    Triscuspid Valve Regurgitation Peak Gradient 17 mmHg    PV PEAK VELOCITY 0.84 m/s    PV peak gradient 3 mmHg    Ao root annulus 3.29 cm    IVC diameter 1.07 cm    Mean e' 0.10 m/s    ZLVIDS 3.78     ZLVIDD 1.09     AORTIC VALVE CUSP SEPERATION 2.51 cm    RVDD 3.20 cm    RV-healy mid d 2.4 cm    RV-healy length 8.0 cm    RV/LV Ratio 0.49 cm    EF 20 %    Narrative      Left Ventricle: The left ventricle is moderately dilated. There is   severely reduced systolic function with a visually estimated ejection   fraction of less than 30%. Ejection fraction by visual approximation is   20%.    Right Ventricle: Normal right ventricular cavity size.    Left Atrium: Left atrium is mildly dilated.    Aortic Valve: The aortic valve is a trileaflet valve. There is mild   aortic regurgitation.    Mitral Valve: There is mild regurgitation.    Pulmonic Valve: There is mild regurgitation.    ICD wire in the right heart.      and X-Ray: CXR: X-Ray Chest 1 View (CXR): No results found for this visit on 06/01/24. and X-Ray Chest PA and Lateral (CXR): No results found for this visit on 06/01/24.

## 2024-06-02 NOTE — SUBJECTIVE & OBJECTIVE
Interval History:     HUAN  Says no new complains, awaiting [facility]    Review of Systems   Constitutional:  Positive for unexpected weight change. Negative for activity change, chills and fatigue.   Respiratory:  Negative for shortness of breath and wheezing.    Cardiovascular:  Negative for chest pain, palpitations and leg swelling.   Gastrointestinal:  Negative for abdominal distention, abdominal pain, diarrhea, nausea and vomiting.   Genitourinary:  Negative for difficulty urinating, dysuria and enuresis.   Musculoskeletal:  Negative for arthralgias.   Skin:  Negative for color change.   Neurological:  Negative for dizziness, syncope, weakness and light-headedness.   Hematological:  Negative for adenopathy.   Psychiatric/Behavioral:  Negative for agitation.      Objective:     Vital Signs (Most Recent):  Temp: 97.7 °F (36.5 °C) (06/02/24 0731)  Pulse: 72 (06/02/24 0731)  Resp: 17 (06/02/24 0731)  BP: 105/62 (06/02/24 0731)  SpO2: 99 % (06/02/24 0731) Vital Signs (24h Range):  Temp:  [97.6 °F (36.4 °C)-98.2 °F (36.8 °C)] 97.7 °F (36.5 °C)  Pulse:  [72-87] 72  Resp:  [17-18] 17  SpO2:  [93 %-99 %] 99 %  BP: ()/(62-76) 105/62     Weight: 80.9 kg (178 lb 5.6 oz)  Body mass index is 24.88 kg/m².    Intake/Output Summary (Last 24 hours) at 6/2/2024 1109  Last data filed at 6/2/2024 0000  Gross per 24 hour   Intake 73.27 ml   Output 500 ml   Net -426.73 ml         Physical Exam  Vitals and nursing note reviewed.   Constitutional:       General: He is not in acute distress.     Appearance: Normal appearance. He is normal weight. He is not ill-appearing or toxic-appearing.   HENT:      Head: Normocephalic and atraumatic.      Right Ear: External ear normal.      Left Ear: External ear normal.      Nose: Nose normal.      Mouth/Throat:      Mouth: Mucous membranes are moist.   Eyes:      Conjunctiva/sclera: Conjunctivae normal.   Cardiovascular:      Rate and Rhythm: Normal rate and regular rhythm.      Pulses: Normal  pulses.      Heart sounds: Normal heart sounds. No murmur heard.     No friction rub.   Pulmonary:      Effort: Pulmonary effort is normal. No respiratory distress.      Breath sounds: Normal breath sounds. No wheezing.   Abdominal:      General: Abdomen is flat. There is no distension.      Palpations: Abdomen is soft.   Musculoskeletal:         General: No swelling or tenderness.      Cervical back: Neck supple.      Right lower leg: No edema.      Left lower leg: No edema.   Skin:     General: Skin is warm.      Coloration: Skin is not jaundiced.      Findings: Lesion present. No bruising.   Neurological:      Mental Status: He is alert and oriented to person, place, and time.   Psychiatric:         Behavior: Behavior normal.             Significant Labs: All pertinent labs within the past 24 hours have been reviewed.    Significant Imaging: I have reviewed all pertinent imaging results/findings within the past 24 hours.

## 2024-06-03 ENCOUNTER — TELEPHONE (OUTPATIENT)
Dept: EMERGENCY MEDICINE | Facility: HOSPITAL | Age: 64
End: 2024-06-03
Payer: MEDICAID

## 2024-06-03 PROBLEM — J01.10 ACUTE NON-RECURRENT FRONTAL SINUSITIS: Status: RESOLVED | Noted: 2024-02-27 | Resolved: 2024-06-03

## 2024-06-03 LAB
ALBUMIN SERPL BCP-MCNC: 3.5 G/DL (ref 3.5–5)
ALBUMIN/GLOB SERPL: 0.9 {RATIO}
ALP SERPL-CCNC: 60 U/L (ref 45–115)
ALT SERPL W P-5'-P-CCNC: 28 U/L (ref 16–61)
ANION GAP SERPL CALCULATED.3IONS-SCNC: 7 MMOL/L (ref 7–16)
AST SERPL W P-5'-P-CCNC: 33 U/L (ref 15–37)
BASOPHILS # BLD AUTO: 0.05 K/UL (ref 0–0.2)
BASOPHILS NFR BLD AUTO: 0.8 % (ref 0–1)
BILIRUB SERPL-MCNC: 0.3 MG/DL (ref ?–1.2)
BUN SERPL-MCNC: 17 MG/DL (ref 7–18)
BUN/CREAT SERPL: 13 (ref 6–20)
CALCIUM SERPL-MCNC: 9.9 MG/DL (ref 8.5–10.1)
CATH EF QUANTITATIVE: 30 %
CHLORIDE SERPL-SCNC: 107 MMOL/L (ref 98–107)
CO2 SERPL-SCNC: 30 MMOL/L (ref 21–32)
CREAT SERPL-MCNC: 1.32 MG/DL (ref 0.7–1.3)
DIFFERENTIAL METHOD BLD: ABNORMAL
EGFR (NO RACE VARIABLE) (RUSH/TITUS): 61 ML/MIN/1.73M2
EOSINOPHIL # BLD AUTO: 0.24 K/UL (ref 0–0.5)
EOSINOPHIL NFR BLD AUTO: 3.7 % (ref 1–4)
ERYTHROCYTE [DISTWIDTH] IN BLOOD BY AUTOMATED COUNT: 14.6 % (ref 11.5–14.5)
GLOBULIN SER-MCNC: 3.7 G/DL (ref 2–4)
GLUCOSE SERPL-MCNC: 100 MG/DL (ref 74–106)
GLUCOSE SERPL-MCNC: 96 MG/DL (ref 70–105)
HCT VFR BLD AUTO: 49 % (ref 40–54)
HGB BLD-MCNC: 15.7 G/DL (ref 13.5–18)
IMM GRANULOCYTES # BLD AUTO: 0.01 K/UL (ref 0–0.04)
IMM GRANULOCYTES NFR BLD: 0.2 % (ref 0–0.4)
LYMPHOCYTES # BLD AUTO: 2.49 K/UL (ref 1–4.8)
LYMPHOCYTES NFR BLD AUTO: 38.3 % (ref 27–41)
MAGNESIUM SERPL-MCNC: 2.4 MG/DL (ref 1.7–2.3)
MCH RBC QN AUTO: 28.5 PG (ref 27–31)
MCHC RBC AUTO-ENTMCNC: 32 G/DL (ref 32–36)
MCV RBC AUTO: 88.9 FL (ref 80–96)
MONOCYTES # BLD AUTO: 0.44 K/UL (ref 0–0.8)
MONOCYTES NFR BLD AUTO: 6.8 % (ref 2–6)
MPC BLD CALC-MCNC: 11.5 FL (ref 9.4–12.4)
NEUTROPHILS # BLD AUTO: 3.27 K/UL (ref 1.8–7.7)
NEUTROPHILS NFR BLD AUTO: 50.2 % (ref 53–65)
NRBC # BLD AUTO: 0 X10E3/UL
NRBC, AUTO (.00): 0 %
OHS QRS DURATION: 150 MS
OHS QTC CALCULATION: 574 MS
PLATELET # BLD AUTO: 142 K/UL (ref 150–400)
POTASSIUM SERPL-SCNC: 3.5 MMOL/L (ref 3.5–5.1)
PROT SERPL-MCNC: 7.2 G/DL (ref 6.4–8.2)
RBC # BLD AUTO: 5.51 M/UL (ref 4.6–6.2)
SODIUM SERPL-SCNC: 140 MMOL/L (ref 136–145)
WBC # BLD AUTO: 6.5 K/UL (ref 4.5–11)

## 2024-06-03 PROCEDURE — 25000003 PHARM REV CODE 250: Performed by: INTERNAL MEDICINE

## 2024-06-03 PROCEDURE — S4991 NICOTINE PATCH NONLEGEND: HCPCS | Performed by: INTERNAL MEDICINE

## 2024-06-03 PROCEDURE — 11000001 HC ACUTE MED/SURG PRIVATE ROOM

## 2024-06-03 PROCEDURE — 4A023N7 MEASUREMENT OF CARDIAC SAMPLING AND PRESSURE, LEFT HEART, PERCUTANEOUS APPROACH: ICD-10-PCS | Performed by: INTERNAL MEDICINE

## 2024-06-03 PROCEDURE — 99152 MOD SED SAME PHYS/QHP 5/>YRS: CPT | Performed by: INTERNAL MEDICINE

## 2024-06-03 PROCEDURE — 93458 L HRT ARTERY/VENTRICLE ANGIO: CPT | Mod: 26,,, | Performed by: INTERNAL MEDICINE

## 2024-06-03 PROCEDURE — B2111ZZ FLUOROSCOPY OF MULTIPLE CORONARY ARTERIES USING LOW OSMOLAR CONTRAST: ICD-10-PCS | Performed by: INTERNAL MEDICINE

## 2024-06-03 PROCEDURE — 25000003 PHARM REV CODE 250

## 2024-06-03 PROCEDURE — 36415 COLL VENOUS BLD VENIPUNCTURE: CPT | Performed by: INTERNAL MEDICINE

## 2024-06-03 PROCEDURE — 93458 L HRT ARTERY/VENTRICLE ANGIO: CPT | Performed by: INTERNAL MEDICINE

## 2024-06-03 PROCEDURE — 80053 COMPREHEN METABOLIC PANEL: CPT | Performed by: INTERNAL MEDICINE

## 2024-06-03 PROCEDURE — 27000221 HC OXYGEN, UP TO 24 HOURS

## 2024-06-03 PROCEDURE — 63600175 PHARM REV CODE 636 W HCPCS: Performed by: INTERNAL MEDICINE

## 2024-06-03 PROCEDURE — 83735 ASSAY OF MAGNESIUM: CPT | Performed by: INTERNAL MEDICINE

## 2024-06-03 PROCEDURE — 63600175 PHARM REV CODE 636 W HCPCS

## 2024-06-03 PROCEDURE — 85025 COMPLETE CBC W/AUTO DIFF WBC: CPT | Performed by: INTERNAL MEDICINE

## 2024-06-03 PROCEDURE — 99153 MOD SED SAME PHYS/QHP EA: CPT | Performed by: INTERNAL MEDICINE

## 2024-06-03 PROCEDURE — C1887 CATHETER, GUIDING: HCPCS | Performed by: INTERNAL MEDICINE

## 2024-06-03 PROCEDURE — 82962 GLUCOSE BLOOD TEST: CPT

## 2024-06-03 PROCEDURE — 94761 N-INVAS EAR/PLS OXIMETRY MLT: CPT

## 2024-06-03 PROCEDURE — 99152 MOD SED SAME PHYS/QHP 5/>YRS: CPT | Mod: ,,, | Performed by: INTERNAL MEDICINE

## 2024-06-03 PROCEDURE — 25500020 PHARM REV CODE 255: Performed by: INTERNAL MEDICINE

## 2024-06-03 PROCEDURE — 27201423 OPTIME MED/SURG SUP & DEVICES STERILE SUPPLY: Performed by: INTERNAL MEDICINE

## 2024-06-03 PROCEDURE — B2151ZZ FLUOROSCOPY OF LEFT HEART USING LOW OSMOLAR CONTRAST: ICD-10-PCS | Performed by: INTERNAL MEDICINE

## 2024-06-03 RX ORDER — LIDOCAINE HYDROCHLORIDE 10 MG/ML
INJECTION INFILTRATION; PERINEURAL
Status: DISCONTINUED | OUTPATIENT
Start: 2024-06-03 | End: 2024-06-03 | Stop reason: HOSPADM

## 2024-06-03 RX ORDER — MIDAZOLAM HYDROCHLORIDE 1 MG/ML
INJECTION INTRAMUSCULAR; INTRAVENOUS
Status: DISCONTINUED | OUTPATIENT
Start: 2024-06-03 | End: 2024-06-03 | Stop reason: HOSPADM

## 2024-06-03 RX ORDER — ACETAMINOPHEN 325 MG/1
650 TABLET ORAL EVERY 4 HOURS PRN
Status: DISCONTINUED | OUTPATIENT
Start: 2024-06-03 | End: 2024-06-04 | Stop reason: HOSPADM

## 2024-06-03 RX ORDER — SODIUM CHLORIDE 9 MG/ML
INJECTION, SOLUTION INTRAVENOUS
Status: DISCONTINUED | OUTPATIENT
Start: 2024-06-03 | End: 2024-06-04 | Stop reason: HOSPADM

## 2024-06-03 RX ORDER — ENOXAPARIN SODIUM 100 MG/ML
40 INJECTION SUBCUTANEOUS EVERY 24 HOURS
Status: DISCONTINUED | OUTPATIENT
Start: 2024-06-04 | End: 2024-06-04

## 2024-06-03 RX ORDER — HEPARIN SODIUM 5000 [USP'U]/ML
INJECTION, SOLUTION INTRAVENOUS; SUBCUTANEOUS
Status: DISCONTINUED | OUTPATIENT
Start: 2024-06-03 | End: 2024-06-03 | Stop reason: HOSPADM

## 2024-06-03 RX ORDER — SODIUM CHLORIDE 450 MG/100ML
INJECTION, SOLUTION INTRAVENOUS CONTINUOUS
Status: DISCONTINUED | OUTPATIENT
Start: 2024-06-03 | End: 2024-06-04

## 2024-06-03 RX ORDER — FENTANYL CITRATE 50 UG/ML
INJECTION, SOLUTION INTRAMUSCULAR; INTRAVENOUS
Status: DISCONTINUED | OUTPATIENT
Start: 2024-06-03 | End: 2024-06-03 | Stop reason: HOSPADM

## 2024-06-03 RX ORDER — NITROGLYCERIN 5 MG/ML
INJECTION, SOLUTION INTRAVENOUS
Status: DISCONTINUED | OUTPATIENT
Start: 2024-06-03 | End: 2024-06-03 | Stop reason: HOSPADM

## 2024-06-03 RX ADMIN — ASPIRIN 81 MG CHEWABLE TABLET 81 MG: 81 TABLET CHEWABLE at 08:06

## 2024-06-03 RX ADMIN — CARVEDILOL 12.5 MG: 12.5 TABLET, FILM COATED ORAL at 08:06

## 2024-06-03 RX ADMIN — NICOTINE 1 PATCH: 7 PATCH, EXTENDED RELEASE TRANSDERMAL at 08:06

## 2024-06-03 RX ADMIN — CLOPIDOGREL BISULFATE 75 MG: 75 TABLET ORAL at 08:06

## 2024-06-03 RX ADMIN — ATORVASTATIN CALCIUM 80 MG: 80 TABLET, FILM COATED ORAL at 09:06

## 2024-06-03 RX ADMIN — SPIRONOLACTONE 25 MG: 25 TABLET ORAL at 08:06

## 2024-06-03 RX ADMIN — CARVEDILOL 12.5 MG: 12.5 TABLET, FILM COATED ORAL at 09:06

## 2024-06-03 RX ADMIN — FUROSEMIDE 40 MG: 10 INJECTION, SOLUTION INTRAMUSCULAR; INTRAVENOUS at 08:06

## 2024-06-03 RX ADMIN — SODIUM CHLORIDE: 4.5 INJECTION, SOLUTION INTRAVENOUS at 04:06

## 2024-06-03 RX ADMIN — FUROSEMIDE 40 MG: 10 INJECTION, SOLUTION INTRAMUSCULAR; INTRAVENOUS at 09:06

## 2024-06-03 RX ADMIN — SACUBITRIL AND VALSARTAN 1 TABLET: 97; 103 TABLET, FILM COATED ORAL at 08:06

## 2024-06-03 RX ADMIN — DULOXETINE HYDROCHLORIDE 30 MG: 30 CAPSULE, DELAYED RELEASE ORAL at 08:06

## 2024-06-03 RX ADMIN — SACUBITRIL AND VALSARTAN 1 TABLET: 97; 103 TABLET, FILM COATED ORAL at 09:06

## 2024-06-03 NOTE — PLAN OF CARE
[facility]  Initial Discharge Assessment       Primary Care Provider: Beatriz Mejia FNP    Admission Diagnosis: NSTEMI (non-ST elevated myocardial infarction) [I21.4]    Admission Date: 6/1/2024  Expected Discharge Date:     Transition of Care Barriers: None    Payor: MEDICAID [place] / Plan: MEDICAID [facility] [place] PLAN [place] / Product Type: Managed Medicaid /     Extended Emergency Contact Information  Primary Emergency Contact: Janina Peralta  Mobile Phone: [phone]  Relation: Sister  Preferred language: English   needed? No    Discharge Plan A: Home with family  Discharge Plan B: Home with family, Home Health      [facility] [place] [place] - [address]  [facility] [address]  [place] [address]  Phone: [phone] Fax: [phone]    [facility] [address] [place] [address] - [address] [place] [address]  Phone: [phone] Fax: [phone]    [facility] [place] - [address]  [address]  [place] [address]  Phone: [phone] Fax: [phone]      Initial Assessment (most recent)       Adult Discharge Assessment - 06/03/24 1240          Discharge Assessment    Assessment Type Discharge Planning Assessment     Confirmed/corrected address, phone number and insurance Yes     Confirmed Demographics Correct on Facesheet     Source of Information patient     People in Home sibling(s)     Do you expect to return to your current living situation? Yes     Do you have help at home or someone to help you manage your care at home? Yes     Who are your caregiver(s) and their phone number(s)? Henrik Conde, brother; Janina Peralta, sister     Prior to hospitilization cognitive status: Unable to Assess     Current cognitive status: Alert/Oriented     Walking or Climbing Stairs Difficulty no     Dressing/Bathing Difficulty no     Home Accessibility wheelchair accessible     Home Layout Able to live on  1st floor     Equipment Currently Used at Home none     Readmission within 30 days? No     Patient currently being followed by outpatient case management? No     Do you currently have service(s) that help you manage your care at home? No     Do you take prescription medications? Yes     Do you have prescription coverage? Yes     Coverage [facility]     Do you have any problems affording any of your prescribed medications? No     Is the patient taking medications as prescribed? yes     Who is going to help you get home at discharge? Janina Peralta, sister, [phone]     How do you get to doctors appointments? family or friend will provide     Are you on dialysis? No     Do you take coumadin? No     Discharge Plan A Home with family     Discharge Plan B Home with family;Home Health     DME Needed Upon Discharge  none     Discharge Plan discussed with: Patient     Transition of Care Barriers None                      SS spoke with pt at bedside. Pt lives at home with his brother, Henrik. Pt plans to return to current living arrangements when medically ready for discharge. Pt does not currently require DME and is not current with HH. SS following for anticipated dc needs.

## 2024-06-04 VITALS
TEMPERATURE: 98 F | OXYGEN SATURATION: 98 % | RESPIRATION RATE: 18 BRPM | DIASTOLIC BLOOD PRESSURE: 74 MMHG | HEART RATE: 86 BPM | HEIGHT: 71 IN | WEIGHT: 177.5 LBS | BODY MASS INDEX: 24.85 KG/M2 | SYSTOLIC BLOOD PRESSURE: 101 MMHG

## 2024-06-04 PROBLEM — E87.6 HYPOKALEMIA: Status: RESOLVED | Noted: 2021-09-30 | Resolved: 2024-06-04

## 2024-06-04 LAB
ALBUMIN SERPL BCP-MCNC: 3.4 G/DL (ref 3.5–5)
ALBUMIN/GLOB SERPL: 1 {RATIO}
ALP SERPL-CCNC: 60 U/L (ref 45–115)
ALT SERPL W P-5'-P-CCNC: 26 U/L (ref 16–61)
ANION GAP SERPL CALCULATED.3IONS-SCNC: 8 MMOL/L (ref 7–16)
AST SERPL W P-5'-P-CCNC: 31 U/L (ref 15–37)
BASOPHILS # BLD AUTO: 0.05 K/UL (ref 0–0.2)
BASOPHILS NFR BLD AUTO: 0.8 % (ref 0–1)
BILIRUB SERPL-MCNC: 0.4 MG/DL (ref ?–1.2)
BUN SERPL-MCNC: 19 MG/DL (ref 7–18)
BUN/CREAT SERPL: 14 (ref 6–20)
CALCIUM SERPL-MCNC: 9.5 MG/DL (ref 8.5–10.1)
CHLORIDE SERPL-SCNC: 104 MMOL/L (ref 98–107)
CO2 SERPL-SCNC: 31 MMOL/L (ref 21–32)
CREAT SERPL-MCNC: 1.36 MG/DL (ref 0.7–1.3)
DIFFERENTIAL METHOD BLD: ABNORMAL
EGFR (NO RACE VARIABLE) (RUSH/TITUS): 59 ML/MIN/1.73M2
EOSINOPHIL # BLD AUTO: 0.24 K/UL (ref 0–0.5)
EOSINOPHIL NFR BLD AUTO: 3.6 % (ref 1–4)
ERYTHROCYTE [DISTWIDTH] IN BLOOD BY AUTOMATED COUNT: 14.6 % (ref 11.5–14.5)
GLOBULIN SER-MCNC: 3.3 G/DL (ref 2–4)
GLUCOSE SERPL-MCNC: 125 MG/DL (ref 70–105)
GLUCOSE SERPL-MCNC: 129 MG/DL (ref 74–106)
GLUCOSE SERPL-MCNC: 95 MG/DL (ref 70–105)
HCT VFR BLD AUTO: 47.5 % (ref 40–54)
HGB BLD-MCNC: 15.3 G/DL (ref 13.5–18)
IMM GRANULOCYTES # BLD AUTO: 0.01 K/UL (ref 0–0.04)
IMM GRANULOCYTES NFR BLD: 0.2 % (ref 0–0.4)
LYMPHOCYTES # BLD AUTO: 2.01 K/UL (ref 1–4.8)
LYMPHOCYTES NFR BLD AUTO: 30.2 % (ref 27–41)
MAGNESIUM SERPL-MCNC: 2.3 MG/DL (ref 1.7–2.3)
MCH RBC QN AUTO: 28.8 PG (ref 27–31)
MCHC RBC AUTO-ENTMCNC: 32.2 G/DL (ref 32–36)
MCV RBC AUTO: 89.3 FL (ref 80–96)
MONOCYTES # BLD AUTO: 0.46 K/UL (ref 0–0.8)
MONOCYTES NFR BLD AUTO: 6.9 % (ref 2–6)
MPC BLD CALC-MCNC: 11.5 FL (ref 9.4–12.4)
NEUTROPHILS # BLD AUTO: 3.89 K/UL (ref 1.8–7.7)
NEUTROPHILS NFR BLD AUTO: 58.3 % (ref 53–65)
NRBC # BLD AUTO: 0 X10E3/UL
NRBC, AUTO (.00): 0 %
PLATELET # BLD AUTO: 129 K/UL (ref 150–400)
POTASSIUM SERPL-SCNC: 3.7 MMOL/L (ref 3.5–5.1)
PROT SERPL-MCNC: 6.7 G/DL (ref 6.4–8.2)
RBC # BLD AUTO: 5.32 M/UL (ref 4.6–6.2)
SODIUM SERPL-SCNC: 139 MMOL/L (ref 136–145)
WBC # BLD AUTO: 6.66 K/UL (ref 4.5–11)

## 2024-06-04 PROCEDURE — 99233 SBSQ HOSP IP/OBS HIGH 50: CPT | Mod: ,,, | Performed by: REGISTERED NURSE

## 2024-06-04 PROCEDURE — 80053 COMPREHEN METABOLIC PANEL: CPT | Performed by: INTERNAL MEDICINE

## 2024-06-04 PROCEDURE — 82962 GLUCOSE BLOOD TEST: CPT

## 2024-06-04 PROCEDURE — 25000242 PHARM REV CODE 250 ALT 637 W/ HCPCS: Performed by: STUDENT IN AN ORGANIZED HEALTH CARE EDUCATION/TRAINING PROGRAM

## 2024-06-04 PROCEDURE — S4991 NICOTINE PATCH NONLEGEND: HCPCS | Performed by: INTERNAL MEDICINE

## 2024-06-04 PROCEDURE — 25000003 PHARM REV CODE 250: Performed by: INTERNAL MEDICINE

## 2024-06-04 PROCEDURE — 94761 N-INVAS EAR/PLS OXIMETRY MLT: CPT

## 2024-06-04 PROCEDURE — 99239 HOSP IP/OBS DSCHRG MGMT >30: CPT | Mod: ,,, | Performed by: INTERNAL MEDICINE

## 2024-06-04 PROCEDURE — 25000003 PHARM REV CODE 250: Performed by: STUDENT IN AN ORGANIZED HEALTH CARE EDUCATION/TRAINING PROGRAM

## 2024-06-04 PROCEDURE — 25000003 PHARM REV CODE 250

## 2024-06-04 PROCEDURE — 63600175 PHARM REV CODE 636 W HCPCS

## 2024-06-04 PROCEDURE — 83735 ASSAY OF MAGNESIUM: CPT | Performed by: INTERNAL MEDICINE

## 2024-06-04 PROCEDURE — 85025 COMPLETE CBC W/AUTO DIFF WBC: CPT | Performed by: INTERNAL MEDICINE

## 2024-06-04 PROCEDURE — 36415 COLL VENOUS BLD VENIPUNCTURE: CPT | Performed by: INTERNAL MEDICINE

## 2024-06-04 RX ORDER — FUROSEMIDE 40 MG/1
40 TABLET ORAL 2 TIMES DAILY
Status: DISCONTINUED | OUTPATIENT
Start: 2024-06-04 | End: 2024-06-04 | Stop reason: HOSPADM

## 2024-06-04 RX ORDER — NICOTINE 7MG/24HR
1 PATCH, TRANSDERMAL 24 HOURS TRANSDERMAL DAILY
Qty: 7 PATCH | Refills: 0 | Status: SHIPPED | OUTPATIENT
Start: 2024-06-04 | End: 2024-06-11

## 2024-06-04 RX ORDER — FUROSEMIDE 40 MG/1
40 TABLET ORAL 2 TIMES DAILY
Qty: 60 TABLET | Refills: 0 | Status: SHIPPED | OUTPATIENT
Start: 2024-06-04 | End: 2024-07-04

## 2024-06-04 RX ORDER — NAPROXEN SODIUM 220 MG/1
81 TABLET, FILM COATED ORAL DAILY
Qty: 30 TABLET | Refills: 0 | Status: SHIPPED | OUTPATIENT
Start: 2024-06-05 | End: 2024-07-05

## 2024-06-04 RX ORDER — ATORVASTATIN CALCIUM 80 MG/1
80 TABLET, FILM COATED ORAL NIGHTLY
Qty: 90 TABLET | Refills: 3 | Status: SHIPPED | OUTPATIENT
Start: 2024-06-04 | End: 2025-06-04

## 2024-06-04 RX ORDER — CLOPIDOGREL BISULFATE 75 MG/1
75 TABLET ORAL DAILY
Status: DISCONTINUED | OUTPATIENT
Start: 2024-06-04 | End: 2024-06-04 | Stop reason: HOSPADM

## 2024-06-04 RX ADMIN — FUROSEMIDE 40 MG: 10 INJECTION, SOLUTION INTRAMUSCULAR; INTRAVENOUS at 09:06

## 2024-06-04 RX ADMIN — NICOTINE 1 PATCH: 7 PATCH, EXTENDED RELEASE TRANSDERMAL at 09:06

## 2024-06-04 RX ADMIN — APIXABAN 5 MG: 5 TABLET, FILM COATED ORAL at 09:06

## 2024-06-04 RX ADMIN — CARVEDILOL 12.5 MG: 12.5 TABLET, FILM COATED ORAL at 09:06

## 2024-06-04 RX ADMIN — ASPIRIN 81 MG CHEWABLE TABLET 81 MG: 81 TABLET CHEWABLE at 09:06

## 2024-06-04 RX ADMIN — SODIUM CHLORIDE: 4.5 INJECTION, SOLUTION INTRAVENOUS at 05:06

## 2024-06-04 RX ADMIN — DULOXETINE HYDROCHLORIDE 30 MG: 30 CAPSULE, DELAYED RELEASE ORAL at 09:06

## 2024-06-04 RX ADMIN — SPIRONOLACTONE 25 MG: 25 TABLET ORAL at 09:06

## 2024-06-04 RX ADMIN — CLOPIDOGREL BISULFATE 75 MG: 75 TABLET ORAL at 09:06

## 2024-06-04 RX ADMIN — EMPAGLIFLOZIN 10 MG: 10 TABLET, FILM COATED ORAL at 09:06

## 2024-06-04 RX ADMIN — SACUBITRIL AND VALSARTAN 1 TABLET: 97; 103 TABLET, FILM COATED ORAL at 09:06

## 2024-06-04 NOTE — ASSESSMENT & PLAN NOTE
- Patient is without shortness of breath and CXR with no acute findings  - Pending Echo  - Continue GDMT through Entresto, BB, Spironolactone, and Lasix  - Supplemental oxygen PRN  - Daily weight with strict Intake and output   - Reduce salt to about 2g daily and fluid intake to about 2L  - Telemetry monitoring    06/02/2024  Echo revealed an approximated EF of 30% with mildly dilated left atrium.He is scheduled for [facility] tomorrow.     6/4/24  -change Lasix to 40 mg b.i.d.  -start Jardiance continue beta-blocker, Aldactone, Entresto  -follow up with his cardiologist 2 weeks

## 2024-06-04 NOTE — PROGRESS NOTES
[facility]  Cardiology  Progress Note    Patient Name: Reed Conde  MRN: [id number]  Admission Date: 6/1/2024  Hospital Length of Stay: 3 days  Code Status: Full Code   Attending Physician: Mayito Loco MD   Primary Care Physician: Beatriz Mejia FNP  Expected Discharge Date: 6/4/2024  Principal Problem:NSTEMI (non-ST elevated myocardial infarction)    Subjective:     Hospital Course:   No notes on file    Interval History:  No significant events overnight.  Status post left heart catheterization found to have nonobstructive coronary artery disease.  Upon exam no chest pain or dyspnea reported.  Right wrist LHC site without hematoma.  Slight ecchymosis noted    Review of Systems   Constitutional: Negative for diaphoresis.   Cardiovascular:  Negative for chest pain, irregular heartbeat, orthopnea and palpitations.   Respiratory:  Negative for shortness of breath.    Psychiatric/Behavioral:  Negative for altered mental status and depression.    All other systems reviewed and are negative.    Objective:     Vital Signs (Most Recent):  Temp: 97.5 °F (36.4 °C) (06/04/24 1057)  Pulse: 86 (06/04/24 1057)  Resp: 18 (06/04/24 1057)  BP: 101/74 (06/04/24 1057)  SpO2: 98 % (06/04/24 1057) Vital Signs (24h Range):  Temp:  [97.5 °F (36.4 °C)-98.1 °F (36.7 °C)] 97.5 °F (36.4 °C)  Pulse:  [56-88] 86  Resp:  [18] 18  SpO2:  [89 %-99 %] 98 %  BP: ()/(61-84) 101/74     Weight: 80.5 kg (177 lb 7.5 oz)  Body mass index is 24.75 kg/m².     SpO2: 98 %         Intake/Output Summary (Last 24 hours) at 6/4/2024 1237  Last data filed at 6/4/2024 0657  Gross per 24 hour   Intake 1037.69 ml   Output --   Net 1037.69 ml       Lines/Drains/Airways       None                      Physical Exam  Vitals reviewed.   Constitutional:       General: He is not in acute distress.  HENT:      Head: Normocephalic.      Mouth/Throat:      Mouth: Mucous membranes are moist.   Eyes:      Extraocular  Movements: Extraocular movements intact.   Cardiovascular:      Rate and Rhythm: Normal rate.      Pulses: Normal pulses.      Heart sounds: Normal heart sounds.   Pulmonary:      Effort: Pulmonary effort is normal.      Breath sounds: Normal breath sounds.   Abdominal:      General: Bowel sounds are normal. There is no distension.      Palpations: Abdomen is soft.   Skin:     General: Skin is warm.      Capillary Refill: Capillary refill takes less than 2 seconds.      Comments: Right wrist without hematoma   Neurological:      General: No focal deficit present.      Mental Status: He is alert and oriented to person, place, and time.      Cranial Nerves: No cranial nerve deficit.      Sensory: No sensory deficit.   Psychiatric:         Mood and Affect: Mood normal.            Significant Labs: All pertinent lab results from the last 24 hours have been reviewed.      Assessment and Plan:     Brief HPI: 63 year old male presented to the [facility] with chest pain. Chest pain started around 6-7 pm yesterday. Patient was laying on the couch when it started. It was intermittent at first and then became constant so he went to the ED. Pain is on the left side, pressure like, doesn't radiate. First time since 2009 he is having chest pain. No cough but SOB, no edema in the legs. No palpitation at this moment but patient reports he does have chronic intermitten palpitations at times.      CAD s/p 3 stents back in 2009 and had an ICD place back in 2010. Dr. Patterson at [place] did stenting and he has not seen him since, and Dr. Fitzgerald did ICD. Patient hasn't seen Dr. Fitzgerald since 3 years ago. PCP writes medications, ADITYA Mejia. Patient reports he takes his medication and is compliant.   Last stress test was done 2018. Patient reports he has A fib but he is not on any anticoagulation. Smoker with 20 pack year Hx.     * NSTEMI (non-ST elevated myocardial infarction)  - ROBERTO CARLOS score of 5 with 26 % of all-cause mortality and  Heart Score with moderate risk  - Troponin of 9470 from 488 and EKG with no ST elevation   - Telemetry monitoring  - Continue ASA, BB, Statin and Lovenox  - Pending Echo  - Supplemental Oxygen and nitroglycerin\    06/02/2024  Echo revealed an approximated EF of 20% with mildly dilated left atrium.He is scheduled for [facility] tomorrow. We would need to obtain his medical record from his cardiologist to review the reason why he is not on anticoagulation therapy.  6/4/24  Left heart catheterization shows 50% distal LAD lesion  Continue current cardiac medications at discharge    Type 2 diabetes mellitus, without long-term current use of insulin  Manage by the primary team    Atrial fibrillation, chronic  Patient with history of Afib and follows with cardiology at [facility] but is not on any AC. It is unsure why the patient is not on anticoagulation. We will need to get medical record from  his cardiologist to find out why he is not on anticoagulation.     Pending Echo and continue with BB and Lovenox for now as per NSTEMI protocol       06/02/2024  We would need to obtain his medical record from his cardiologist to review the reason why he is not on anticoagulation therapy.         CHF (congestive heart failure)  - Patient is without shortness of breath and CXR with no acute findings  - Pending Echo  - Continue GDMT through Entresto, BB, Spironolactone, and Lasix  - Supplemental oxygen PRN  - Daily weight with strict Intake and output   - Reduce salt to about 2g daily and fluid intake to about 2L  - Telemetry monitoring    06/02/2024  Echo revealed an approximated EF of 30% with mildly dilated left atrium.He is scheduled for C tomorrow.     6/4/24  -change Lasix to 40 mg b.i.d.  -start Jardiance continue beta-blocker, Aldactone, Entresto  -follow up with his cardiologist 2 weeks          VTE Risk Mitigation (From admission, onward)           Ordered     IP VTE LOW RISK PATIENT  Once         06/01/24 0420     Place sequential  compression device  Until discontinued         06/01/24 6621                    Tom Butler, BREP  Cardiology  [facility]

## 2024-06-04 NOTE — HOSPITAL COURSE
For a more detailed hospital course see IP notes briefly, patient was admitted with shortness of breath, found to have elevated troponins, cardiology seen and evaluated the patient and he underwent left heart catheterization yesterday, which showed nonobstructive coronary artery disease with elevated filling pressures. Cardiology team and I looked into patients chart and records , couldn't find any afib, therefore pt will just dc home on asa. Pt is to see his cardiologist at [facility] for further meidcation titration. Med rec done w/ help of cardiology service.

## 2024-06-04 NOTE — DISCHARGE SUMMARY
[facility] Hospital Medicine  Discharge Summary      Patient Name: Reed Conde  MRN: [id number]  KISHA: [id number]  Patient Class: IP- Inpatient  Admission Date: 6/1/2024  Hospital Length of Stay: 3 days  Discharge Date and Time:  06/04/2024 12:21 PM  Attending Physician: Mayito Loco MD   Discharging Provider: Mayito Loco MD  Primary Care Provider: Beatriz Mejia FNP    Primary Care Team: Networked reference to record PCT     HPI:   63 year old male presented to the [place] ED with chest pain. Chest pain started around 6-7 pm yesterday. Patient was laying on the couch when it started. It was intermittent at first and then became constant so he went to the ED. Pain is on the left side, pressure like, doesn't radiate. First time since 2009 he is having chest pain. No cough but SOB, no edema in the legs. No palpitation at this moment but patient reports he does have chronic intermitten palpitations at times.     CAD s/p 3 stents back in 2009 and had an ICD place back in 2010. Dr. Patterson at [facility] did stenting and he has not seen him since, and Dr. Fitzgerald did ICD. Patient hasn't seen Dr. Fitzgerald since 3 years ago. PCP writes medications, ADITYA Mejia. Patient reports he takes his medication and is compliant.   Last stress test was done 2018. Patient reports he has A fib but he is not on any anticoagulation. Smoker with 20 pack year Hx.     ED course:  Elevated troponin  Abnormal EKG but no st elevations  Received aspirin 325 in the ED.     Procedure(s) (LRB):  Angiogram, Coronary, with Left Heart Cath (N/A)      Hospital Course:   For a more detailed hospital course see IP notes briefly, patient was admitted with shortness of breath, found to have elevated troponins, cardiology seen and evaluated the patient and he underwent left heart catheterization yesterday, which showed nonobstructive coronary artery disease with elevated filling pressures. Cardiology team and I  looked into patients chart and records , couldn't find any afib, therefore pt will just dc home on asa. Pt is to see his cardiologist at [facility] for further meidcation titration. Med rec done w/ help of cardiology service.      Goals of Care Treatment Preferences:  Code Status: Full Code      Consults:   Consults (From admission, onward)          Status Ordering Provider     Inpatient consult to Cardiology  Once        Provider:  (Not yet assigned)    JUSTIN Crump            No new Assessment & Plan notes have been filed under this hospital service since the last note was generated.  Service: Hospital Medicine    Final Active Diagnoses:    Diagnosis Date Noted POA    PRINCIPAL PROBLEM:  NSTEMI (non-ST elevated myocardial infarction) [I21.4] 06/01/2024 Yes    CHF (congestive heart failure) [I50.9] 06/01/2024 Yes    Atrial fibrillation, chronic [I48.20] 06/01/2024 Yes    Type 2 diabetes mellitus, without long-term current use of insulin [E11.9] 06/01/2024 Yes    Hypokalemia [E87.6] 09/30/2021 Yes      Problems Resolved During this Admission:       Discharged Condition: stable    Disposition:     Follow Up:   Follow-up Information       dr. joy Follow up on 6/11/2024.    Why: [facility]; 9:20 am             Beatriz Mejia FNP Follow up on 6/13/2024.    Specialty: Family Medicine  Why: 9:20 am  Contact information:  [address] [place] [id number]  [phone]                           Patient Instructions:      Ambulatory referral/consult to Smoking Cessation Program   Standing Status: Future   Referral Priority: Routine Referral Type: Consultation   Referral Reason: Specialty Services Required   Requested Specialty: CTTS   Number of Visits Requested: 1     Diet Cardiac     Notify your health care provider if you experience any of the following:  temperature >100.4     Notify your health care provider if you experience any of the following:  persistent nausea and vomiting or diarrhea      Notify your health care provider if you experience any of the following:  severe uncontrolled pain     Notify your health care provider if you experience any of the following:  difficulty breathing or increased cough     Notify your health care provider if you experience any of the following:  severe persistent headache     Notify your health care provider if you experience any of the following:  worsening rash     Notify your health care provider if you experience any of the following:  persistent dizziness, light-headedness, or visual disturbances     Notify your health care provider if you experience any of the following:  increased confusion or weakness     Cardiac rehab phase II   Standing Status: Future Standing Exp. Date: 06/03/25     Order Specific Question Answer Comments   Department [facility] CARDIAC REHAB    Select qualifying diagnosis: I21.4 - Non-ST elevation (NSTEMI) myocardial infarction      Activity as tolerated       Significant Diagnostic Studies: N/A    Pending Diagnostic Studies:       None           Medications:  Reconciled Home Medications:      Medication List        START taking these medications      aspirin 81 MG Chew  Take 1 tablet (81 mg total) by mouth once daily.  Start taking on: June 5, 2024     empagliflozin 10 mg tablet  Commonly known as: Jardiance  Take 1 tablet (10 mg total) by mouth once daily.  Start taking on: June 5, 2024     nicotine 7 mg/24 hr  Commonly known as: NICODERM CQ  Place 1 patch onto the skin once daily. Do not smoke with the patch on for 7 days            CHANGE how you take these medications      atorvastatin 80 MG tablet  Commonly known as: LIPITOR  Take 1 tablet (80 mg total) by mouth every evening.  What changed:   medication strength  how much to take  how to take this  when to take this  additional instructions     furosemide 40 MG tablet  Commonly known as: LASIX  Take 1 tablet (40 mg total) by mouth 2 (two) times daily.  What changed:   medication  strength  how much to take  when to take this            CONTINUE taking these medications      carvediloL 12.5 MG tablet  Commonly known as: COREG  Take 1 tablet (12.5 mg total) by mouth 2 (two) times daily.     cetirizine 10 MG tablet  Commonly known as: ZYRTEC  Take 1 tablet (10 mg total) by mouth once daily.     DULoxetine 30 MG capsule  Commonly known as: CYMBALTA  Take 1 capsule (30 mg total) by mouth once daily.     ENTRESTO  mg per tablet  Generic drug: sacubitriL-valsartan  Take 1 tablet by mouth 2 (two) times daily.     fluticasone propionate 50 mcg/actuation nasal spray  Commonly known as: FLONASE  1 spray (50 mcg total) by Each Nostril route once daily.     nitroGLYCERIN 0.4 MG SL tablet  Commonly known as: NITROSTAT     spironolactone 25 MG tablet  Commonly known as: ALDACTONE  Take 1 tablet (25 mg total) by mouth once daily.            STOP taking these medications      azithromycin 250 MG tablet  Commonly known as: Z-FLAVIO     clopidogreL 75 mg tablet  Commonly known as: PLAVIX     hydroCHLOROthiazide 25 MG tablet  Commonly known as: HYDRODIURIL              Indwelling Lines/Drains at time of discharge:   Lines/Drains/Airways       None                   Time spent on the discharge of patient: >30 minutes         Rehmat JADE Loco MD  Department of Hospital Medicine  [facility]

## 2024-06-04 NOTE — PROGRESS NOTES
[facility] Hospital Medicine  Progress Note    Patient Name: Reed Conde  MRN: [id number]  Patient Class: IP- Inpatient   Admission Date: 6/1/2024  Length of Stay: 2 days  Attending Physician: Mayito Loco MD  Primary Care Provider: Beatriz Mejia FNP        Subjective:     Principal Problem:NSTEMI (non-ST elevated myocardial infarction)        HPI:  63 year old male presented to the [place] ED with chest pain. Chest pain started around 6-7 pm yesterday. Patient was laying on the couch when it started. It was intermittent at first and then became constant so he went to the ED. Pain is on the left side, pressure like, doesn't radiate. First time since 2009 he is having chest pain. No cough but SOB, no edema in the legs. No palpitation at this moment but patient reports he does have chronic intermitten palpitations at times.     CAD s/p 3 stents back in 2009 and had an ICD place back in 2010. Dr. Patterson at [facility] did stenting and he has not seen him since, and Dr. Fitzgerald did ICD. Patient hasn't seen Dr. Fitzgerald since 3 years ago. PCP writes medications, NP Jackie. Patient reports he takes his medication and is compliant.   Last stress test was done 2018. Patient reports he has A fib but he is not on any anticoagulation. Smoker with 20 pack year Hx.     ED course:  Elevated troponin  Abnormal EKG but no st elevations  Received aspirin 325 in the ED.       Overview/Hospital Course:  No notes on file  Pt awaitng [facility], no new concerns    Physical exam  HEENT JULIO EOMI, NCAT  CV S1+S2, RRR  Chest CTA  Abdomen soft non tender  Neuro :intact,baseline    No new subjective & objective note has been filed under this hospital service since the last note was generated.    Assessment/Plan:      * NSTEMI (non-ST elevated myocardial infarction)  ROBERTO CARLOS score: 5 points  EKG was done and there is no ST elevation. There are some abnormalities on the EKG will get another one.   Troponin  >400. Second one is pending  Started patient on Heparin drip  Continue home Coreg and Entresto  Holding home HCTZ for now  Increased home lipitor 40 mg to 80 mg  Continue aldactone 25 mg  Continue home plavix  Patient received Aspirin 325 in ED will start him on aspirin 81 daily  Morphine and oxygen as needed  Cardiac monitoring  Echo pending  Cardiology consulted    Type 2 diabetes mellitus, without long-term current use of insulin    Recent A1c of 6.6  Not on any medication  Patient should be started on farxiga or Cardizem prior discharge    Elevated d-dimer  Wells score is 0  D dimer was done in the ED and it is elevated at 0.9  Patient's Cr is elevated  Starting him on heparin drip regardless for NSTEMI  Will do LE doppler  CTA or V/Q based on Cr when appropriate       Atrial fibrillation, chronic  Not currently a fib on the monitor  Reports he has palpitations from time to time  Not on any anticoagulation for A fib  Just Coreg as rate control  Echo pending  Cardiology consulted  Cardiac monitoring   On heparin Drip at this point     CHF (congestive heart failure)  No signs or symptoms of CHF. Chest xray has clear lungs.  Patient takes Lasix 80 mg PO at home daily  Will start him on Lasix 40 mg BID while at the hospital  Continue home meds  Patient has ICD placed back in 2010  Echo pending  Cardiology consulted  Weight daily     Hypokalemia  Patient has hypokalemia which is Acute and currently uncontrolled. Most recent potassium levels reviewed-   Lab Results   Component Value Date    K 3.1 (L) 05/31/2024   . Will continue potassium replacement per protocol and recheck repeat levels after replacement completed.       VTE Risk Mitigation (From admission, onward)           Ordered     enoxaparin injection 40 mg  Daily         06/03/24 1613     IP VTE LOW RISK PATIENT  Once         06/01/24 0420     Place sequential compression device  Until discontinued         06/01/24 0420                    Discharge Planning    LA:      Code Status: Full Code   Is the patient medically ready for discharge?:       Reason for patient still in hospital (select all that apply): Laboratory test, Treatment, and Consult recommendations  Discharge Plan A: Home with family                  Rehmat JADE Loco MD  Department of Hospital Medicine   [facility]

## 2024-06-04 NOTE — DISCHARGE SUMMARY
[facility] Hospital Medicine  Discharge Summary      Patient Name: Reed Conde  MRN: [id number]  KISHA: [id number]  Patient Class: IP- Inpatient  Admission Date: 6/1/2024  Hospital Length of Stay: 3 days  Discharge Date and Time:  06/04/2024 11:33 AM  Attending Physician: Mayito Loco MD   Discharging Provider: Mayito Loco MD  Primary Care Provider: Beatriz Mejia FNP    Primary Care Team: Networked reference to record PCT     HPI:   63 year old male presented to the [place] ED with chest pain. Chest pain started around 6-7 pm yesterday. Patient was laying on the couch when it started. It was intermittent at first and then became constant so he went to the ED. Pain is on the left side, pressure like, doesn't radiate. First time since 2009 he is having chest pain. No cough but SOB, no edema in the legs. No palpitation at this moment but patient reports he does have chronic intermitten palpitations at times.     CAD s/p 3 stents back in 2009 and had an ICD place back in 2010. Dr. Patterson at [facility] did stenting and he has not seen him since, and Dr. Fitzgerald did ICD. Patient hasn't seen Dr. Fitzgerald since 3 years ago. PCP writes medications, ADITYA Mejia. Patient reports he takes his medication and is compliant.   Last stress test was done 2018. Patient reports he has A fib but he is not on any anticoagulation. Smoker with 20 pack year Hx.     ED course:  Elevated troponin  Abnormal EKG but no st elevations  Received aspirin 325 in the ED.     Procedure(s) (LRB):  Angiogram, Coronary, with Left Heart Cath (N/A)      Hospital Course:   For a more detailed hospital course see IP notes briefly, patient was admitted with shortness of breath, found to have elevated troponins, cardiology seen and evaluated the patient and he underwent left heart catheterization yesterday, which showed nonobstructive coronary artery disease with elevated filling pressures.  Cardiology also  recommended him starting on Eliquis since he mentioned about afib in the past, his plavix will be taken off and continue asa. Pt is to see his cardiologist at [facility] for further meidcation titration. Med rec done w/ help of cardiology service.      Goals of Care Treatment Preferences:  Code Status: Full Code      Consults:   Consults (From admission, onward)          Status Ordering Provider     Inpatient consult to Cardiology  Once        Provider:  (Not yet assigned)    Completed JUSTIN PAULA            No new Assessment & Plan notes have been filed under this hospital service since the last note was generated.  Service: Hospital Medicine    Final Active Diagnoses:    Diagnosis Date Noted POA    PRINCIPAL PROBLEM:  NSTEMI (non-ST elevated myocardial infarction) [I21.4] 06/01/2024 Yes    CHF (congestive heart failure) [I50.9] 06/01/2024 Yes    Atrial fibrillation, chronic [I48.20] 06/01/2024 Yes    Type 2 diabetes mellitus, without long-term current use of insulin [E11.9] 06/01/2024 Yes    Hypokalemia [E87.6] 09/30/2021 Yes      Problems Resolved During this Admission:       Discharged Condition: stable    Disposition:     Follow Up:   Follow-up Information       dr. joy Follow up in 1 week(s).    Why: [facility] F[facility] Follow up in 1 week(s).    Specialty: Family Medicine  Contact information:  [address] [place] [address] [place] [id number]  [phone]                           Patient Instructions:      Ambulatory referral/consult to Smoking Cessation Program   Standing Status: Future   Referral Priority: Routine Referral Type: Consultation   Referral Reason: Specialty Services Required   Requested Specialty: CTTS   Number of Visits Requested: 1     Diet Cardiac     Notify your health care provider if you experience any of the following:  temperature >100.4     Notify your health care provider if you experience any of the following:  persistent nausea and vomiting or diarrhea     Notify  your health care provider if you experience any of the following:  severe uncontrolled pain     Notify your health care provider if you experience any of the following:  difficulty breathing or increased cough     Notify your health care provider if you experience any of the following:  severe persistent headache     Notify your health care provider if you experience any of the following:  worsening rash     Notify your health care provider if you experience any of the following:  persistent dizziness, light-headedness, or visual disturbances     Notify your health care provider if you experience any of the following:  increased confusion or weakness     Cardiac rehab phase II   Standing Status: Future Standing Exp. Date: 06/03/25     Order Specific Question Answer Comments   Department [facility] CARDIAC REHAB    Select qualifying diagnosis: I21.4 - Non-ST elevation (NSTEMI) myocardial infarction      Activity as tolerated       Significant Diagnostic Studies: N/A    Pending Diagnostic Studies:       None           Medications:  Reconciled Home Medications:      Medication List        START taking these medications      apixaban 5 mg Tab  Commonly known as: ELIQUIS  Take 1 tablet (5 mg total) by mouth 2 (two) times daily.     aspirin 81 MG Chew  Take 1 tablet (81 mg total) by mouth once daily.  Start taking on: June 5, 2024     empagliflozin 10 mg tablet  Commonly known as: Jardiance  Take 1 tablet (10 mg total) by mouth once daily.  Start taking on: June 5, 2024     nicotine 7 mg/24 hr  Commonly known as: NICODERM CQ  Place 1 patch onto the skin once daily. Do not smoke with the patch on for 7 days            CHANGE how you take these medications      atorvastatin 80 MG tablet  Commonly known as: LIPITOR  Take 1 tablet (80 mg total) by mouth every evening.  What changed:   medication strength  how much to take  how to take this  when to take this  additional instructions     furosemide 40 MG tablet  Commonly known  as: LASIX  Take 1 tablet (40 mg total) by mouth 2 (two) times daily.  What changed:   medication strength  how much to take  when to take this            CONTINUE taking these medications      carvediloL 12.5 MG tablet  Commonly known as: COREG  Take 1 tablet (12.5 mg total) by mouth 2 (two) times daily.     cetirizine 10 MG tablet  Commonly known as: ZYRTEC  Take 1 tablet (10 mg total) by mouth once daily.     DULoxetine 30 MG capsule  Commonly known as: CYMBALTA  Take 1 capsule (30 mg total) by mouth once daily.     ENTRESTO  mg per tablet  Generic drug: sacubitriL-valsartan  Take 1 tablet by mouth 2 (two) times daily.     fluticasone propionate 50 mcg/actuation nasal spray  Commonly known as: FLONASE  1 spray (50 mcg total) by Each Nostril route once daily.     nitroGLYCERIN 0.4 MG SL tablet  Commonly known as: NITROSTAT     spironolactone 25 MG tablet  Commonly known as: ALDACTONE  Take 1 tablet (25 mg total) by mouth once daily.            STOP taking these medications      azithromycin 250 MG tablet  Commonly known as: Z-FLAVIO     clopidogreL 75 mg tablet  Commonly known as: PLAVIX     hydroCHLOROthiazide 25 MG tablet  Commonly known as: HYDRODIURIL              Indwelling Lines/Drains at time of discharge:   Lines/Drains/Airways       None                   Time spent on the discharge of patient: >30 minutes         Rehmat JADE Loco MD  Department of Hospital Medicine  [facility]

## 2024-06-04 NOTE — SUBJECTIVE & OBJECTIVE
Interval History:  No significant events overnight.  Status post left heart catheterization found to have nonobstructive coronary artery disease.  Upon exam no chest pain or dyspnea reported.  Right wrist LHC site without hematoma.  Slight ecchymosis noted    Review of Systems   Constitutional: Negative for diaphoresis.   Cardiovascular:  Negative for chest pain, irregular heartbeat, orthopnea and palpitations.   Respiratory:  Negative for shortness of breath.    Psychiatric/Behavioral:  Negative for altered mental status and depression.    All other systems reviewed and are negative.    Objective:     Vital Signs (Most Recent):  Temp: 97.5 °F (36.4 °C) (06/04/24 1057)  Pulse: 86 (06/04/24 1057)  Resp: 18 (06/04/24 1057)  BP: 101/74 (06/04/24 1057)  SpO2: 98 % (06/04/24 1057) Vital Signs (24h Range):  Temp:  [97.5 °F (36.4 °C)-98.1 °F (36.7 °C)] 97.5 °F (36.4 °C)  Pulse:  [56-88] 86  Resp:  [18] 18  SpO2:  [89 %-99 %] 98 %  BP: ()/(61-84) 101/74     Weight: 80.5 kg (177 lb 7.5 oz)  Body mass index is 24.75 kg/m².     SpO2: 98 %         Intake/Output Summary (Last 24 hours) at 6/4/2024 1237  Last data filed at 6/4/2024 0657  Gross per 24 hour   Intake 1037.69 ml   Output --   Net 1037.69 ml       Lines/Drains/Airways       None                      Physical Exam  Vitals reviewed.   Constitutional:       General: He is not in acute distress.  HENT:      Head: Normocephalic.      Mouth/Throat:      Mouth: Mucous membranes are moist.   Eyes:      Extraocular Movements: Extraocular movements intact.   Cardiovascular:      Rate and Rhythm: Normal rate.      Pulses: Normal pulses.      Heart sounds: Normal heart sounds.   Pulmonary:      Effort: Pulmonary effort is normal.      Breath sounds: Normal breath sounds.   Abdominal:      General: Bowel sounds are normal. There is no distension.      Palpations: Abdomen is soft.   Skin:     General: Skin is warm.      Capillary Refill: Capillary refill takes less than 2  seconds.      Comments: Right wrist without hematoma   Neurological:      General: No focal deficit present.      Mental Status: He is alert and oriented to person, place, and time.      Cranial Nerves: No cranial nerve deficit.      Sensory: No sensory deficit.   Psychiatric:         Mood and Affect: Mood normal.            Significant Labs: All pertinent lab results from the last 24 hours have been reviewed.

## 2024-06-04 NOTE — PLAN OF CARE
Problem: Adult Inpatient Plan of Care  Goal: Plan of Care Review  Outcome: Adequate for Care Transition  Goal: Patient-Specific Goal (Individualized)  Outcome: Adequate for Care Transition  Goal: Absence of Hospital-Acquired Illness or Injury  Outcome: Adequate for Care Transition  Goal: Optimal Comfort and Wellbeing  Outcome: Adequate for Care Transition  Goal: Readiness for Transition of Care  Outcome: Adequate for Care Transition     Problem: Suicide Risk  Goal: Absence of Self-Harm  Outcome: Adequate for Care Transition     Problem: Diabetes Comorbidity  Goal: Blood Glucose Level Within Targeted Range  Outcome: Adequate for Care Transition     Problem: Fall Injury Risk  Goal: Absence of Fall and Fall-Related Injury  Outcome: Adequate for Care Transition     Problem: Wound  Goal: Optimal Coping  Outcome: Adequate for Care Transition  Goal: Optimal Functional Ability  Outcome: Adequate for Care Transition  Goal: Absence of Infection Signs and Symptoms  Outcome: Adequate for Care Transition  Goal: Improved Oral Intake  Outcome: Adequate for Care Transition  Goal: Optimal Pain Control and Function  Outcome: Adequate for Care Transition  Goal: Skin Health and Integrity  Outcome: Adequate for Care Transition  Goal: Optimal Wound Healing  Outcome: Adequate for Care Transition

## 2024-06-04 NOTE — ASSESSMENT & PLAN NOTE
- ROBERTO CARLOS score of 5 with 26 % of all-cause mortality and Heart Score with moderate risk  - Troponin of 9470 from 488 and EKG with no ST elevation   - Telemetry monitoring  - Continue ASA, BB, Statin and Lovenox  - Pending Echo  - Supplemental Oxygen and nitroglycerin\    06/02/2024  Echo revealed an approximated EF of 20% with mildly dilated left atrium.He is scheduled for [facility] tomorrow. We would need to obtain his medical record from his cardiologist to review the reason why he is not on anticoagulation therapy.  6/4/24  Left heart catheterization shows 50% distal LAD lesion  Continue current cardiac medications at discharge

## 2024-06-04 NOTE — PLAN OF CARE
[facility] Telemetry  Discharge Final Note    Primary Care Provider: Beatriz Mejia FNP    Expected Discharge Date: 6/4/2024    Final Discharge Note (most recent)       Final Note - 06/04/24 1445          Final Note    Assessment Type Final Discharge Note     Anticipated Discharge Disposition Home or Self Care     What phone number can be called within the next 1-3 days to see how you are doing after discharge? [phone]        Post-Acute Status    Coverage [facility]     Discharge Delays None known at this time                     Important Message from Medicare             Contact Info       dr. joy        Next Steps: Follow up on 6/11/2024    Instructions: CIS of [place]; 9:20 am    Beatriz Mejia FNP   Specialty: Family Medicine   Relationship: PCP - General    [address] [place] [id number]   Phone: [phone]       Next Steps: Follow up on 6/13/2024    Instructions: 9:20 am          Pt to dc home with family. No further needs

## 2024-06-04 NOTE — PLAN OF CARE
Problem: Adult Inpatient Plan of Care  Goal: Plan of Care Review  Outcome: Progressing  Goal: Patient-Specific Goal (Individualized)  Outcome: Progressing  Goal: Absence of Hospital-Acquired Illness or Injury  Outcome: Progressing  Goal: Optimal Comfort and Wellbeing  Outcome: Progressing  Goal: Readiness for Transition of Care  Outcome: Progressing     Problem: Suicide Risk  Goal: Absence of Self-Harm  Outcome: Progressing     Problem: Diabetes Comorbidity  Goal: Blood Glucose Level Within Targeted Range  Outcome: Progressing     Problem: Fall Injury Risk  Goal: Absence of Fall and Fall-Related Injury  Outcome: Progressing     Problem: Wound  Goal: Optimal Coping  Outcome: Progressing  Goal: Optimal Functional Ability  Outcome: Progressing  Goal: Absence of Infection Signs and Symptoms  Outcome: Progressing  Goal: Improved Oral Intake  Outcome: Progressing  Goal: Optimal Pain Control and Function  Outcome: Progressing  Goal: Skin Health and Integrity  Outcome: Progressing  Goal: Optimal Wound Healing  Outcome: Progressing

## 2024-06-08 NOTE — PHYSICIAN QUERY
"Please further specify the heart failure diagnosis.     To respond, click "New Note" select your response press enter then sign to complete the query     Acute Systolic Heart Failure (HFrEF or HFmrEF)          "

## 2024-09-02 PROBLEM — I21.4 NSTEMI (NON-ST ELEVATED MYOCARDIAL INFARCTION): Status: RESOLVED | Noted: 2024-06-01 | Resolved: 2024-09-02

## 2024-09-05 ENCOUNTER — APPOINTMENT (OUTPATIENT)
Dept: RADIOLOGY | Facility: CLINIC | Age: 64
End: 2024-09-05
Attending: NURSE PRACTITIONER
Payer: MEDICAID

## 2024-09-05 ENCOUNTER — OFFICE VISIT (OUTPATIENT)
Dept: FAMILY MEDICINE | Facility: CLINIC | Age: 64
End: 2024-09-05
Payer: MEDICAID

## 2024-09-05 VITALS
BODY MASS INDEX: 24.89 KG/M2 | WEIGHT: 177.81 LBS | DIASTOLIC BLOOD PRESSURE: 79 MMHG | SYSTOLIC BLOOD PRESSURE: 112 MMHG | HEIGHT: 71 IN | TEMPERATURE: 98 F | OXYGEN SATURATION: 98 % | RESPIRATION RATE: 20 BRPM | HEART RATE: 62 BPM

## 2024-09-05 DIAGNOSIS — R05.1 ACUTE COUGH: Primary | ICD-10-CM

## 2024-09-05 DIAGNOSIS — J34.89 SINUS PRESSURE: ICD-10-CM

## 2024-09-05 DIAGNOSIS — J30.9 CHRONIC ALLERGIC RHINITIS: ICD-10-CM

## 2024-09-05 LAB
CTP QC/QA: YES
MOLECULAR STREP A: NEGATIVE
POC MOLECULAR INFLUENZA A AGN: NEGATIVE
POC MOLECULAR INFLUENZA B AGN: NEGATIVE
SARS-COV-2 RDRP RESP QL NAA+PROBE: NEGATIVE

## 2024-09-05 PROCEDURE — 87651 STREP A DNA AMP PROBE: CPT | Mod: RHCUB | Performed by: NURSE PRACTITIONER

## 2024-09-05 PROCEDURE — 87502 INFLUENZA DNA AMP PROBE: CPT | Mod: RHCUB | Performed by: NURSE PRACTITIONER

## 2024-09-05 PROCEDURE — 1159F MED LIST DOCD IN RCRD: CPT | Mod: CPTII,,, | Performed by: NURSE PRACTITIONER

## 2024-09-05 PROCEDURE — 3078F DIAST BP <80 MM HG: CPT | Mod: CPTII,,, | Performed by: NURSE PRACTITIONER

## 2024-09-05 PROCEDURE — 99214 OFFICE O/P EST MOD 30 MIN: CPT | Mod: 25,,, | Performed by: NURSE PRACTITIONER

## 2024-09-05 PROCEDURE — 96372 THER/PROPH/DIAG INJ SC/IM: CPT | Mod: ,,, | Performed by: NURSE PRACTITIONER

## 2024-09-05 PROCEDURE — 3008F BODY MASS INDEX DOCD: CPT | Mod: CPTII,,, | Performed by: NURSE PRACTITIONER

## 2024-09-05 PROCEDURE — 70220 X-RAY EXAM OF SINUSES: CPT | Mod: TC,RHCUB | Performed by: NURSE PRACTITIONER

## 2024-09-05 PROCEDURE — 87635 SARS-COV-2 COVID-19 AMP PRB: CPT | Mod: RHCUB | Performed by: NURSE PRACTITIONER

## 2024-09-05 PROCEDURE — 4010F ACE/ARB THERAPY RXD/TAKEN: CPT | Mod: CPTII,,, | Performed by: NURSE PRACTITIONER

## 2024-09-05 PROCEDURE — 3044F HG A1C LEVEL LT 7.0%: CPT | Mod: CPTII,,, | Performed by: NURSE PRACTITIONER

## 2024-09-05 PROCEDURE — 3074F SYST BP LT 130 MM HG: CPT | Mod: CPTII,,, | Performed by: NURSE PRACTITIONER

## 2024-09-05 PROCEDURE — 1160F RVW MEDS BY RX/DR IN RCRD: CPT | Mod: CPTII,,, | Performed by: NURSE PRACTITIONER

## 2024-09-05 RX ORDER — FLUTICASONE PROPIONATE 50 MCG
1 SPRAY, SUSPENSION (ML) NASAL DAILY
Qty: 18.2 ML | Refills: 1 | Status: SHIPPED | OUTPATIENT
Start: 2024-09-05

## 2024-09-05 RX ORDER — METHYLPREDNISOLONE ACETATE 40 MG/ML
40 INJECTION, SUSPENSION INTRA-ARTICULAR; INTRALESIONAL; INTRAMUSCULAR; SOFT TISSUE ONCE
Status: COMPLETED | OUTPATIENT
Start: 2024-09-05 | End: 2024-09-05

## 2024-09-05 RX ORDER — DEXAMETHASONE SODIUM PHOSPHATE 4 MG/ML
4 INJECTION, SOLUTION INTRA-ARTICULAR; INTRALESIONAL; INTRAMUSCULAR; INTRAVENOUS; SOFT TISSUE
Status: COMPLETED | OUTPATIENT
Start: 2024-09-05 | End: 2024-09-05

## 2024-09-05 RX ORDER — AZITHROMYCIN 250 MG/1
TABLET, FILM COATED ORAL
Qty: 6 TABLET | Refills: 0 | Status: SHIPPED | OUTPATIENT
Start: 2024-09-05 | End: 2024-09-10

## 2024-09-05 RX ORDER — LATANOPROST 50 UG/ML
1 SOLUTION/ DROPS OPHTHALMIC NIGHTLY
COMMUNITY
Start: 2024-07-01

## 2024-09-05 RX ADMIN — METHYLPREDNISOLONE ACETATE 40 MG: 40 INJECTION, SUSPENSION INTRA-ARTICULAR; INTRALESIONAL; INTRAMUSCULAR; SOFT TISSUE at 02:09

## 2024-09-05 RX ADMIN — DEXAMETHASONE SODIUM PHOSPHATE 4 MG: 4 INJECTION, SOLUTION INTRA-ARTICULAR; INTRALESIONAL; INTRAMUSCULAR; INTRAVENOUS; SOFT TISSUE at 02:09

## 2024-09-05 NOTE — PROGRESS NOTES
[facility]    Wendy De Los Santos AGPCNP-BC  [facility] [id number]  [place] [address]  [phone]          PATIENT NAME: Reed Conde  : 1960  DATE: 24  MRN: [id number]          Reason for Visit        Chief Complaint   Patient presents with    Sinus Problem     Pt symptoms started yesterday. Coughing, nasal congestion, head aches. Pt has not taking ant OTC meds.        History of Present Illness      Reed Conde is a 63 y.o. male with chronic conditions of Cardiomyopathy, Hypertension, Hyperlipidemia, Hx of MI, Cervicalgia, Presence of Biventricular implantable cardioverter defibrillator, Depression, and Chronic Allergic Rhinitis who presents today for c/o sinus problems. Reports symptoms started yesterday, had low grade fever last night. Productive cough this morning. He has not taken anything OTC for his symptoms. Influenza A, Influenza B, Covid, and Rapid Strep all negative. Sinus series revealed paranasal sinuses are clear.     MEDICAL / SURGICAL / SOCIAL HISTORY     Past Medical History:   Diagnosis Date    Cardiomyopathy     Hypertension     Mixed hyperlipidemia     Myocardial infarction 10/28/2015    Prolapse of cervical intervertebral disc without radiculopathy 09/10/2019       Past Surgical History:   Procedure Laterality Date    ANGIOGRAM, CORONARY, WITH LEFT HEART CATHETERIZATION N/A 6/3/2024    Procedure: Angiogram, Coronary, with Left Heart Cath;  Surgeon: Henrik Mcarthur MD;  Location: [facility] CATH LAB;  Service: Cardiology;  Laterality: N/A;    INSERTION OF BIVENTRICULAR IMPLANTABLE CARDIOVERTER-DEFIBRILLATOR (ICD)         Social History     Tobacco Use    Smoking status: Every Day     Current packs/day: 0.50     Average packs/day: 0.5 packs/day for 47.7 years (23.8 ttl pk-yrs)     Types: Cigarettes     Start date:     Smokeless tobacco: Never   Substance Use Topics    Alcohol use:  "Not Currently    Drug use: Yes     Types: Marijuana, Cocaine         I personally reviewed all past medical, surgical, and social.     Review of Systems   Constitutional:  Positive for fever (low grade fever last night). Negative for chills.   HENT:  Positive for congestion, postnasal drip ("that's what makes me cough") and rhinorrhea. Negative for ear pain and sore throat.    Respiratory:  Positive for cough (productive). Negative for shortness of breath.    Cardiovascular:  Negative for chest pain.   Gastrointestinal:  Negative for nausea.   Neurological:  Negative for dizziness, light-headedness and headaches.        MEDICATIONS / ALLERGIES / HM     Current Outpatient Medications   Medication Sig Dispense Refill    aspirin 81 MG Chew Take 1 tablet (81 mg total) by mouth once daily. 30 tablet 0    atorvastatin (LIPITOR) 80 MG tablet Take 1 tablet (80 mg total) by mouth every evening. 90 tablet 3    carvediloL (COREG) 12.5 MG tablet Take 1 tablet (12.5 mg total) by mouth 2 (two) times daily. 180 tablet 1    cetirizine (ZYRTEC) 10 MG tablet Take 1 tablet (10 mg total) by mouth once daily. 30 tablet 1    DULoxetine (CYMBALTA) 30 MG capsule Take 1 capsule (30 mg total) by mouth once daily. 90 capsule 1    fluticasone propionate (FLONASE) 50 mcg/actuation nasal spray 1 spray (50 mcg total) by Each Nostril route once daily. 11.1 mL 0    furosemide (LASIX) 40 MG tablet Take 1 tablet (40 mg total) by mouth 2 (two) times daily. 60 tablet 0    latanoprost 0.005 % ophthalmic solution Place 1 drop into both eyes every evening.      nitroGLYCERIN (NITROSTAT) 0.4 MG SL tablet       sacubitriL-valsartan (ENTRESTO)  mg per tablet Take 1 tablet by mouth 2 (two) times daily.      spironolactone (ALDACTONE) 25 MG tablet Take 1 tablet (25 mg total) by mouth once daily. 90 tablet 1     No current facility-administered medications for this visit.       Review of patient's allergies indicates:  No Known Allergies    Immunization " "History   Administered Date(s) Administered    Pneumococcal Conjugate - 20 Valent 12/14/2022        Health Maintenance   Topic Date Due    Foot Exam  Never done    Eye Exam  Never done    TETANUS VACCINE  Never done    Colorectal Cancer Screening  Never done    LDCT Lung Screen  Never done    Shingles Vaccine (1 of 2) Never done    Hemoglobin A1c  10/08/2024    Lipid Panel  06/01/2025    Low Dose Statin  06/04/2025    Hepatitis C Screening  Completed        Physical Exam      Vital Signs  Temp: 97.7 °F (36.5 °C)  Temp Source: Oral  Pulse: 62  Resp: 20  SpO2: 98 %  BP: 112/79  BP Location: Left arm  Patient Position: Sitting  Pain Score: 0-No pain  Height and Weight  Height: 5' 11" (180.3 cm)  Weight: 80.6 kg (177 lb 12.8 oz)  BSA (Calculated - sq m): 2.01 sq meters  BMI (Calculated): 24.8  Weight in (lb) to have BMI = 25: 178.9]    Physical Exam  Constitutional:       General: He is not in acute distress.  HENT:      Head: Normocephalic.      Right Ear: Tympanic membrane, ear canal and external ear normal. There is no impacted cerumen.      Left Ear: Tympanic membrane, ear canal and external ear normal. There is no impacted cerumen.      Nose: Congestion and rhinorrhea present.      Mouth/Throat:      Mouth: Mucous membranes are moist.      Pharynx: Posterior oropharyngeal erythema present.      Comments: Post nasal drainage present   Cardiovascular:      Rate and Rhythm: Normal rate and regular rhythm.      Pulses: Normal pulses.      Heart sounds: Normal heart sounds.   Pulmonary:      Effort: Pulmonary effort is normal.      Breath sounds: Normal breath sounds.   Abdominal:      Palpations: Abdomen is soft.      Tenderness: There is no abdominal tenderness.   Skin:     General: Skin is warm and dry.   Neurological:      Mental Status: He is alert and oriented to person, place, and time.   Psychiatric:         Mood and Affect: Mood normal.         Behavior: Behavior normal.        Laboratory:    Lab Results " "  Component Value Date     (H) 06/04/2024     06/04/2024    K 3.7 06/04/2024     06/04/2024    CO2 31 06/04/2024    BUN 19 (H) 06/04/2024    CREATININE 1.36 (H) 06/04/2024    CALCIUM 9.5 06/04/2024    PROT 6.7 06/04/2024    ALBUMIN 3.4 (L) 06/04/2024    BILITOT 0.4 06/04/2024    ALKPHOS 60 06/04/2024    AST 31 06/04/2024    ALT 26 06/04/2024    ANIONGAP 8 06/04/2024    ESTGFRAFRICA 53 (L) 09/29/2021    EGFRNONAA 50 (L) 03/29/2022       Lab Results   Component Value Date    WBC 6.66 06/04/2024    RBC 5.32 06/04/2024    HGB 15.3 06/04/2024    HCT 47.5 06/04/2024    MCV 89.3 06/04/2024    RDW 14.6 (H) 06/04/2024     (L) 06/04/2024        Lab Results   Component Value Date    CHOL 135 06/01/2024    TRIG 72 06/01/2024    HDL 70 (H) 06/01/2024    LDLCALC 51 06/01/2024       No results found for: "TSH"    Lab Results   Component Value Date    HGBA1C 6.6 04/08/2024    ESTIMATEDAVG 143 04/08/2024        No results found for: "CZXCCVOG61"    No results found for: "LPPHYFDW99XE"    Lab Results   Component Value Date    PSA 2.840 03/29/2022         Point Of Care Testing:    Nitrites, UA   Date Value Ref Range Status   06/01/2024 Negative Negative Final     Urobilinogen, UA   Date Value Ref Range Status   06/01/2024 Normal 0.2, 1.0, Normal mg/dL Final     pH, UA   Date Value Ref Range Status   06/01/2024 6.0 5.0 to 8.0 pH Units Final     Specific Gravity, UA   Date Value Ref Range Status   06/01/2024 1.016 <=1.030 Final     Ketones, UA   Date Value Ref Range Status   06/01/2024 Negative Negative mg/dL Final       Lab Results   Component Value Date    GFI70GXTTKWU Negative 09/05/2024    FLUAMOLEC Negative 09/05/2024    FLUBMOLEC Negative 09/05/2024    MOLSTREPAPOC Negative 09/05/2024       XR SINUSES MIN 3 VIEWS     CLINICAL HISTORY:  Other specified disorders of nose and nasal sinuses     TECHNIQUE:  AP, lateral, and Barrientos views of the paranasal sinuses were performed     COMPARISON:  None.   "   FINDINGS:  The visualized paranasal sinuses are clear.     Impression:     As above        Electronically signed by:Anai Tom MD  Date:                                            09/05/2024  Time:                                           13:55      Assessment/Plan     Acute cough  -     POCT COVID-19 Rapid Screening-negative   -     POCT Influenza A/B Molecular-negative  -     POCT Strep A, Molecular-negative   -     azithromycin (Z-FLAVIO) 250 MG tablet; Take 2 tablets by mouth on day 1; Take 1 tablet by mouth on days 2-5  Dispense: 6 tablet; Refill: 0    Sinus pressure  -     X-Ray Sinuses 3 or more views; no acute findings  -     dexAMETHasone injection 4 mg  -     methylPREDNISolone acetate injection 40 mg  -     azithromycin (Z-FLAVIO) 250 MG tablet; Take 2 tablets by mouth on day 1; Take 1 tablet by mouth on days 2-5  Dispense: 6 tablet; Refill: 0    Chronic allergic rhinitis  -     dexAMETHasone injection 4 mg  -     methylPREDNISolone acetate injection 40 mg  -     fluticasone propionate (FLONASE) 50 mcg/actuation nasal spray; 1 spray (50 mcg total) by Each Nostril route once daily.  Dispense: 18.2 mL; Refill: 1    Try to thin the mucus.  Drink lots of liquids to stay hydrated.  Use a cool mist humidifier to avoid dry air.  Use saline nose drops or a saline nose rinse to relieve stuffiness.  Wash your hands often. This will help keep others healthy.  Do not smoke or be in smoke-filled places. Avoid things that may cause breathing problems like fumes, pollution, dust, and other common allergens and irritants.  You may want to take medicine like ibuprofen, naproxen, or acetaminophen to help with pain.  To help you feel better:  Use a cool mist humidifier to avoid breathing dry air.  Use hard candy or cough drops to soothe sore throat and cough.  Gargle with salt water (mix 1/2 teaspoon salt with 1 cup warm water) a few times a day.  Spray saltwater mist in each nostril. Any normal saline spray works.  Sip  warm liquids to keep your throat moist.  Take warm, steamy showers to help soothe the cough.  Do not smoke or be in smoke-filled places. Avoid things that may cause breathing problems like vaping, fumes, pollution, dust, and other common allergens.  You can also use an over-the-counter cough medicine; verify with pharmacist that it does not interact with any other medications.    Follow up with PCP if symptoms persist, worsen, and/or new symptoms develop     No future appointments.    Workup results were reviewed and all questions were answered. Diagnosis and treatment options were discussed and the patient  is amenable with the overall treatment plan. Verbal and written discharge instructions were given including to return to clinic/ED with any acute worsening of symptoms or failure of symptoms to improve. The reasons for return to the clinic/ED were explained in lay terms. No further intervention is warranted at this time. The patient agrees with the plan, expresses understanding, is hemodynamically stable and in no acute distress.     All questions answered to desired level of satisfaction          JALEN Mcclain-BC  [facility]

## 2024-09-05 NOTE — PATIENT INSTRUCTIONS
Try to thin the mucus.  Drink lots of liquids to stay hydrated.  Use a cool mist humidifier to avoid dry air.  Use saline nose drops or a saline nose rinse to relieve stuffiness.  Wash your hands often. This will help keep others healthy.  Do not smoke or be in smoke-filled places. Avoid things that may cause breathing problems like fumes, pollution, dust, and other common allergens and irritants.  You may want to take medicine like ibuprofen, naproxen, or acetaminophen to help with pain.  To help you feel better:  Use a cool mist humidifier to avoid breathing dry air.  Use hard candy or cough drops to soothe sore throat and cough.  Gargle with salt water (mix 1/2 teaspoon salt with 1 cup warm water) a few times a day.  Spray saltwater mist in each nostril. Any normal saline spray works.  Sip warm liquids to keep your throat moist.  Take warm, steamy showers to help soothe the cough.  Do not smoke or be in smoke-filled places. Avoid things that may cause breathing problems like vaping, fumes, pollution, dust, and other common allergens.  You can also use an over-the-counter cough medicine; verify with pharmacist that it does not interact with any other medications.

## 2024-09-06 ENCOUNTER — TELEPHONE (OUTPATIENT)
Dept: FAMILY MEDICINE | Facility: CLINIC | Age: 64
End: 2024-09-06
Payer: MEDICAID

## 2024-09-06 NOTE — TELEPHONE ENCOUNTER
----- Message from Kayla Unger sent at 9/6/2024  2:19 PM CDT -----  Pt called and got very rude with me said that we have sent several messages apparently back to the provider and he does not believe us because he has not received a call back at all he is out of the medication now and needs a refill on the sacubitriL-valsartan (ENTRESTO)  mg per tablet. He continued to be rude with me and said said that if he does not here from anyone he will take his concerns to someone and find him another provider.    Pharmacy: [facility] - [place], [place] - [address] [facility] [address]  Pt #  [phone]

## 2024-09-06 NOTE — TELEPHONE ENCOUNTER
----- Message from Haven Jenkins sent at 9/6/2024 12:40 PM CDT -----  Phone [phone]    Patient requests call back

## 2024-09-11 ENCOUNTER — TELEPHONE (OUTPATIENT)
Dept: FAMILY MEDICINE | Facility: CLINIC | Age: 64
End: 2024-09-11
Payer: MEDICAID

## 2024-09-11 NOTE — TELEPHONE ENCOUNTER
----- Message from Constanza Ross sent at 9/9/2024  9:02 AM CDT -----  sacubitriL-valsartan (ENTRESTO)  mg per tablet patient needs a refill sent to [facility]

## 2024-09-18 RX ORDER — SACUBITRIL AND VALSARTAN 97; 103 MG/1; MG/1
1 TABLET, FILM COATED ORAL 2 TIMES DAILY
Qty: 60 TABLET | Refills: 1 | OUTPATIENT
Start: 2024-09-18

## 2025-08-29 DIAGNOSIS — I50.9 CONGESTIVE HEART FAILURE: Primary | ICD-10-CM

## 2025-09-03 DIAGNOSIS — Z95.810 PRESENCE OF DOUBLE CHAMBER AUTOMATIC CARDIOVERTER/DEFIBRILLATOR (AICD): Primary | ICD-10-CM

## (undated) DEVICE — DRAPE ANGIO BRACH 38X44IN

## (undated) DEVICE — CLIPPER BLADE MOD 4406 (CAREF)

## (undated) DEVICE — GOWN NONREINF SET-IN SLV 2XL

## (undated) DEVICE — PROTECTOR ULNAR NERVE FOAM

## (undated) DEVICE — DECANTER FLUID TRNSF WHITE 9IN

## (undated) DEVICE — SET IV PRIMARY

## (undated) DEVICE — Device

## (undated) DEVICE — CONTRAST ISOVUE 370 100ML

## (undated) DEVICE — SOL IRR SOD CHL .9% POUR

## (undated) DEVICE — SYR MED RAD 150ML

## (undated) DEVICE — ETCO2 NC MICROSTR FEM ST ADLT

## (undated) DEVICE — CATH RADIAL TIG 6FR 4.0 110CM

## (undated) DEVICE — CATH IMPULSE PIGTAIL 6F 110CM

## (undated) DEVICE — GLOVE SENSICARE PI ALOE 5.5

## (undated) DEVICE — OXISENSOR ADULT DIGIT N/S

## (undated) DEVICE — SET EXTENSION CLEARLINK 2INJ

## (undated) DEVICE — TOWEL OR DISP STRL BLUE 4/PK

## (undated) DEVICE — COVER SURG LIGHT HANDLE

## (undated) DEVICE — GLOVE SIGNATURE ESSNTL LTX 7.5

## (undated) DEVICE — KIT HEART ANGIO MFLD LEFT RUSH

## (undated) DEVICE — DRESSING TRANS 4X4 TEGADERM